# Patient Record
Sex: FEMALE | Race: WHITE | NOT HISPANIC OR LATINO | Employment: PART TIME | URBAN - METROPOLITAN AREA
[De-identification: names, ages, dates, MRNs, and addresses within clinical notes are randomized per-mention and may not be internally consistent; named-entity substitution may affect disease eponyms.]

---

## 2017-07-15 ENCOUNTER — APPOINTMENT (OUTPATIENT)
Dept: GENERAL RADIOLOGY | Facility: HOSPITAL | Age: 72
End: 2017-07-15

## 2017-07-15 ENCOUNTER — APPOINTMENT (OUTPATIENT)
Dept: MRI IMAGING | Facility: HOSPITAL | Age: 72
End: 2017-07-15

## 2017-07-15 ENCOUNTER — APPOINTMENT (OUTPATIENT)
Dept: CT IMAGING | Facility: HOSPITAL | Age: 72
End: 2017-07-15

## 2017-07-15 ENCOUNTER — HOSPITAL ENCOUNTER (INPATIENT)
Facility: HOSPITAL | Age: 72
LOS: 3 days | Discharge: HOME | End: 2017-07-18
Attending: EMERGENCY MEDICINE | Admitting: INTERNAL MEDICINE

## 2017-07-15 DIAGNOSIS — G45.8 OTHER SPECIFIED TRANSIENT CEREBRAL ISCHEMIAS: Primary | ICD-10-CM

## 2017-07-15 DIAGNOSIS — Z86.79 HISTORY OF CORONARY ARTERY DISEASE: ICD-10-CM

## 2017-07-15 DIAGNOSIS — Z74.09 IMPAIRED FUNCTIONAL MOBILITY, BALANCE, GAIT, AND ENDURANCE: ICD-10-CM

## 2017-07-15 DIAGNOSIS — I16.9 HYPERTENSIVE CRISIS: ICD-10-CM

## 2017-07-15 DIAGNOSIS — R07.9 CHEST PAIN, UNSPECIFIED TYPE: ICD-10-CM

## 2017-07-15 PROBLEM — G45.9 TRANSIENT CEREBRAL ISCHEMIA: Status: ACTIVE | Noted: 2017-07-15

## 2017-07-15 PROBLEM — I10 HTN (HYPERTENSION): Status: ACTIVE | Noted: 2017-07-15

## 2017-07-15 PROBLEM — I25.10 CAD (CORONARY ARTERY DISEASE): Status: ACTIVE | Noted: 2017-07-15

## 2017-07-15 LAB
ALBUMIN SERPL-MCNC: 3.9 G/DL (ref 3.2–4.8)
ALBUMIN/GLOB SERPL: 0.8 G/DL (ref 1.5–2.5)
ALP SERPL-CCNC: 115 U/L (ref 25–100)
ALT SERPL W P-5'-P-CCNC: 16 U/L (ref 7–40)
ANION GAP SERPL CALCULATED.3IONS-SCNC: 9 MMOL/L (ref 3–11)
AST SERPL-CCNC: 29 U/L (ref 0–33)
BACTERIA UR QL AUTO: NORMAL /HPF
BASOPHILS # BLD AUTO: 0.01 10*3/MM3 (ref 0–0.2)
BASOPHILS NFR BLD AUTO: 0.2 % (ref 0–1)
BILIRUB SERPL-MCNC: 0.2 MG/DL (ref 0.3–1.2)
BILIRUB UR QL STRIP: NEGATIVE
BNP SERPL-MCNC: 125 PG/ML (ref 0–100)
BUN BLD-MCNC: 24 MG/DL (ref 9–23)
BUN/CREAT SERPL: 21.8 (ref 7–25)
CALCIUM SPEC-SCNC: 10 MG/DL (ref 8.7–10.4)
CHLORIDE SERPL-SCNC: 101 MMOL/L (ref 99–109)
CLARITY UR: CLEAR
CO2 SERPL-SCNC: 23 MMOL/L (ref 20–31)
COLOR UR: YELLOW
CREAT BLD-MCNC: 1.1 MG/DL (ref 0.6–1.3)
DEPRECATED RDW RBC AUTO: 40.8 FL (ref 37–54)
EOSINOPHIL # BLD AUTO: 0.11 10*3/MM3 (ref 0–0.3)
EOSINOPHIL NFR BLD AUTO: 2 % (ref 0–3)
ERYTHROCYTE [DISTWIDTH] IN BLOOD BY AUTOMATED COUNT: 12.4 % (ref 11.3–14.5)
GFR SERPL CREATININE-BSD FRML MDRD: 49 ML/MIN/1.73
GLOBULIN UR ELPH-MCNC: 4.7 GM/DL
GLUCOSE BLD-MCNC: 105 MG/DL (ref 70–100)
GLUCOSE UR STRIP-MCNC: NEGATIVE MG/DL
HCT VFR BLD AUTO: 33.2 % (ref 34.5–44)
HGB BLD-MCNC: 11.1 G/DL (ref 11.5–15.5)
HGB UR QL STRIP.AUTO: NEGATIVE
HOLD SPECIMEN: NORMAL
HOLD SPECIMEN: NORMAL
HYALINE CASTS UR QL AUTO: NORMAL /LPF
IMM GRANULOCYTES # BLD: 0 10*3/MM3 (ref 0–0.03)
IMM GRANULOCYTES NFR BLD: 0 % (ref 0–0.6)
KETONES UR QL STRIP: NEGATIVE
LEUKOCYTE ESTERASE UR QL STRIP.AUTO: NEGATIVE
LIPASE SERPL-CCNC: 66 U/L (ref 6–51)
LYMPHOCYTES # BLD AUTO: 0.87 10*3/MM3 (ref 0.6–4.8)
LYMPHOCYTES NFR BLD AUTO: 15.9 % (ref 24–44)
MCH RBC QN AUTO: 30.2 PG (ref 27–31)
MCHC RBC AUTO-ENTMCNC: 33.4 G/DL (ref 32–36)
MCV RBC AUTO: 90.2 FL (ref 80–99)
MONOCYTES # BLD AUTO: 0.42 10*3/MM3 (ref 0–1)
MONOCYTES NFR BLD AUTO: 7.7 % (ref 0–12)
NEUTROPHILS # BLD AUTO: 4.06 10*3/MM3 (ref 1.5–8.3)
NEUTROPHILS NFR BLD AUTO: 74.2 % (ref 41–71)
NITRITE UR QL STRIP: NEGATIVE
PH UR STRIP.AUTO: 6 [PH] (ref 5–8)
PLATELET # BLD AUTO: 242 10*3/MM3 (ref 150–450)
PMV BLD AUTO: 10.2 FL (ref 6–12)
POTASSIUM BLD-SCNC: 4.3 MMOL/L (ref 3.5–5.5)
PROT SERPL-MCNC: 8.6 G/DL (ref 5.7–8.2)
PROT UR QL STRIP: ABNORMAL
RBC # BLD AUTO: 3.68 10*6/MM3 (ref 3.89–5.14)
RBC # UR: NORMAL /HPF
REF LAB TEST METHOD: NORMAL
SODIUM BLD-SCNC: 133 MMOL/L (ref 132–146)
SP GR UR STRIP: <=1.005 (ref 1–1.03)
SQUAMOUS #/AREA URNS HPF: NORMAL /HPF
TROPONIN I SERPL-MCNC: 0 NG/ML (ref 0–0.07)
TROPONIN I SERPL-MCNC: 0 NG/ML (ref 0–0.07)
TROPONIN I SERPL-MCNC: 0.03 NG/ML
TROPONIN I SERPL-MCNC: 0.55 NG/ML
TSH SERPL DL<=0.05 MIU/L-ACNC: 1.47 MIU/ML (ref 0.35–5.35)
UROBILINOGEN UR QL STRIP: ABNORMAL
WBC NRBC COR # BLD: 5.47 10*3/MM3 (ref 3.5–10.8)
WBC UR QL AUTO: NORMAL /HPF
WHOLE BLOOD HOLD SPECIMEN: NORMAL
WHOLE BLOOD HOLD SPECIMEN: NORMAL

## 2017-07-15 PROCEDURE — 71010 HC CHEST PA OR AP: CPT

## 2017-07-15 PROCEDURE — 25010000002 HYDRALAZINE PER 20 MG: Performed by: EMERGENCY MEDICINE

## 2017-07-15 PROCEDURE — 85025 COMPLETE CBC W/AUTO DIFF WBC: CPT | Performed by: EMERGENCY MEDICINE

## 2017-07-15 PROCEDURE — 83690 ASSAY OF LIPASE: CPT | Performed by: EMERGENCY MEDICINE

## 2017-07-15 PROCEDURE — 99222 1ST HOSP IP/OBS MODERATE 55: CPT | Performed by: PSYCHIATRY & NEUROLOGY

## 2017-07-15 PROCEDURE — 70498 CT ANGIOGRAPHY NECK: CPT

## 2017-07-15 PROCEDURE — 70551 MRI BRAIN STEM W/O DYE: CPT

## 2017-07-15 PROCEDURE — 99223 1ST HOSP IP/OBS HIGH 75: CPT | Performed by: HOSPITALIST

## 2017-07-15 PROCEDURE — 0 IOPAMIDOL 61 % SOLUTION: Performed by: HOSPITALIST

## 2017-07-15 PROCEDURE — 84484 ASSAY OF TROPONIN QUANT: CPT

## 2017-07-15 PROCEDURE — 83880 ASSAY OF NATRIURETIC PEPTIDE: CPT | Performed by: EMERGENCY MEDICINE

## 2017-07-15 PROCEDURE — 70450 CT HEAD/BRAIN W/O DYE: CPT

## 2017-07-15 PROCEDURE — 80053 COMPREHEN METABOLIC PANEL: CPT | Performed by: EMERGENCY MEDICINE

## 2017-07-15 PROCEDURE — 81001 URINALYSIS AUTO W/SCOPE: CPT | Performed by: EMERGENCY MEDICINE

## 2017-07-15 PROCEDURE — 25010000002 HEPARIN (PORCINE) PER 1000 UNITS: Performed by: HOSPITALIST

## 2017-07-15 PROCEDURE — 93005 ELECTROCARDIOGRAM TRACING: CPT

## 2017-07-15 PROCEDURE — 84484 ASSAY OF TROPONIN QUANT: CPT | Performed by: HOSPITALIST

## 2017-07-15 PROCEDURE — 84443 ASSAY THYROID STIM HORMONE: CPT | Performed by: HOSPITALIST

## 2017-07-15 PROCEDURE — 93005 ELECTROCARDIOGRAM TRACING: CPT | Performed by: EMERGENCY MEDICINE

## 2017-07-15 PROCEDURE — 99285 EMERGENCY DEPT VISIT HI MDM: CPT

## 2017-07-15 PROCEDURE — 93005 ELECTROCARDIOGRAM TRACING: CPT | Performed by: NURSE PRACTITIONER

## 2017-07-15 PROCEDURE — 25010000002 ONDANSETRON PER 1 MG: Performed by: EMERGENCY MEDICINE

## 2017-07-15 PROCEDURE — 0042T HC CT CEREBRAL PERFUSION W/WO CONTRAST: CPT

## 2017-07-15 PROCEDURE — 70496 CT ANGIOGRAPHY HEAD: CPT

## 2017-07-15 RX ORDER — ALBUTEROL SULFATE 90 UG/1
2 AEROSOL, METERED RESPIRATORY (INHALATION) 4 TIMES DAILY PRN
COMMUNITY

## 2017-07-15 RX ORDER — VALSARTAN AND HYDROCHLOROTHIAZIDE 160; 12.5 MG/1; MG/1
0.5 TABLET, FILM COATED ORAL DAILY
COMMUNITY
End: 2017-07-15

## 2017-07-15 RX ORDER — SODIUM CHLORIDE 0.9 % (FLUSH) 0.9 %
10 SYRINGE (ML) INJECTION AS NEEDED
Status: DISCONTINUED | OUTPATIENT
Start: 2017-07-15 | End: 2017-07-18 | Stop reason: HOSPADM

## 2017-07-15 RX ORDER — ASPIRIN 300 MG/1
300 SUPPOSITORY RECTAL ONCE
Status: DISCONTINUED | OUTPATIENT
Start: 2017-07-15 | End: 2017-07-15

## 2017-07-15 RX ORDER — NITROGLYCERIN 0.4 MG/1
0.4 TABLET SUBLINGUAL
COMMUNITY
End: 2017-07-18 | Stop reason: HOSPADM

## 2017-07-15 RX ORDER — OMEPRAZOLE 20 MG/1
40 CAPSULE, DELAYED RELEASE ORAL DAILY
Status: ON HOLD | COMMUNITY
End: 2017-07-17

## 2017-07-15 RX ORDER — ACETAMINOPHEN 160 MG
2000 TABLET,DISINTEGRATING ORAL NIGHTLY
COMMUNITY

## 2017-07-15 RX ORDER — CLOPIDOGREL BISULFATE 75 MG/1
75 TABLET ORAL DAILY
COMMUNITY
End: 2017-07-18 | Stop reason: HOSPADM

## 2017-07-15 RX ORDER — VALSARTAN AND HYDROCHLOROTHIAZIDE 160; 12.5 MG/1; MG/1
1 TABLET, FILM COATED ORAL DAILY
COMMUNITY
End: 2017-07-18 | Stop reason: HOSPADM

## 2017-07-15 RX ORDER — MECLIZINE HCL 25MG 25 MG/1
25 TABLET, CHEWABLE ORAL 3 TIMES DAILY PRN
COMMUNITY
End: 2018-07-19

## 2017-07-15 RX ORDER — HYDRALAZINE HYDROCHLORIDE 20 MG/ML
20 INJECTION INTRAMUSCULAR; INTRAVENOUS ONCE
Status: COMPLETED | OUTPATIENT
Start: 2017-07-15 | End: 2017-07-15

## 2017-07-15 RX ORDER — ONDANSETRON 2 MG/ML
4 INJECTION INTRAMUSCULAR; INTRAVENOUS ONCE
Status: COMPLETED | OUTPATIENT
Start: 2017-07-15 | End: 2017-07-15

## 2017-07-15 RX ORDER — HEPARIN SODIUM 5000 [USP'U]/ML
5000 INJECTION, SOLUTION INTRAVENOUS; SUBCUTANEOUS EVERY 8 HOURS SCHEDULED
Status: DISCONTINUED | OUTPATIENT
Start: 2017-07-15 | End: 2017-07-16

## 2017-07-15 RX ORDER — ASPIRIN 81 MG/1
324 TABLET, CHEWABLE ORAL ONCE
Status: COMPLETED | OUTPATIENT
Start: 2017-07-15 | End: 2017-07-15

## 2017-07-15 RX ORDER — ACETAMINOPHEN 325 MG/1
650 TABLET ORAL EVERY 6 HOURS PRN
Status: DISCONTINUED | OUTPATIENT
Start: 2017-07-15 | End: 2017-07-18 | Stop reason: HOSPADM

## 2017-07-15 RX ORDER — MECLIZINE HYDROCHLORIDE 25 MG/1
12.5 TABLET ORAL 3 TIMES DAILY PRN
Status: DISCONTINUED | OUTPATIENT
Start: 2017-07-15 | End: 2017-07-18 | Stop reason: HOSPADM

## 2017-07-15 RX ORDER — ASPIRIN 81 MG/1
81 TABLET ORAL EVERY 12 HOURS
COMMUNITY
End: 2018-03-27

## 2017-07-15 RX ORDER — SODIUM CHLORIDE 9 MG/ML
100 INJECTION, SOLUTION INTRAVENOUS CONTINUOUS
Status: DISCONTINUED | OUTPATIENT
Start: 2017-07-15 | End: 2017-07-18 | Stop reason: HOSPADM

## 2017-07-15 RX ORDER — CLOPIDOGREL BISULFATE 75 MG/1
75 TABLET ORAL DAILY
Status: DISCONTINUED | OUTPATIENT
Start: 2017-07-15 | End: 2017-07-16

## 2017-07-15 RX ORDER — ALBUTEROL SULFATE 2.5 MG/3ML
2.5 SOLUTION RESPIRATORY (INHALATION) EVERY 4 HOURS PRN
COMMUNITY
End: 2017-07-15

## 2017-07-15 RX ORDER — PANTOPRAZOLE SODIUM 40 MG/1
40 TABLET, DELAYED RELEASE ORAL EVERY MORNING
Status: DISCONTINUED | OUTPATIENT
Start: 2017-07-16 | End: 2017-07-18 | Stop reason: HOSPADM

## 2017-07-15 RX ORDER — ALBUTEROL SULFATE 2.5 MG/3ML
2.5 SOLUTION RESPIRATORY (INHALATION) EVERY 4 HOURS PRN
Status: CANCELLED | OUTPATIENT
Start: 2017-07-15

## 2017-07-15 RX ORDER — ATORVASTATIN CALCIUM 40 MG/1
80 TABLET, FILM COATED ORAL NIGHTLY
Status: DISCONTINUED | OUTPATIENT
Start: 2017-07-15 | End: 2017-07-18 | Stop reason: HOSPADM

## 2017-07-15 RX ORDER — SODIUM CHLORIDE 0.9 % (FLUSH) 0.9 %
1-10 SYRINGE (ML) INJECTION AS NEEDED
Status: DISCONTINUED | OUTPATIENT
Start: 2017-07-15 | End: 2017-07-18 | Stop reason: HOSPADM

## 2017-07-15 RX ORDER — CARVEDILOL 12.5 MG/1
12.5 TABLET ORAL 2 TIMES DAILY WITH MEALS
COMMUNITY
End: 2018-07-20 | Stop reason: HOSPADM

## 2017-07-15 RX ADMIN — SODIUM CHLORIDE 1000 ML: 9 INJECTION, SOLUTION INTRAVENOUS at 14:06

## 2017-07-15 RX ADMIN — ONDANSETRON 4 MG: 2 INJECTION INTRAMUSCULAR; INTRAVENOUS at 12:58

## 2017-07-15 RX ADMIN — HEPARIN SODIUM 5000 UNITS: 5000 INJECTION, SOLUTION INTRAVENOUS; SUBCUTANEOUS at 22:03

## 2017-07-15 RX ADMIN — HYDRALAZINE HYDROCHLORIDE 20 MG: 20 INJECTION INTRAMUSCULAR; INTRAVENOUS at 11:36

## 2017-07-15 RX ADMIN — SODIUM CHLORIDE 1000 ML: 9 INJECTION, SOLUTION INTRAVENOUS at 12:22

## 2017-07-15 RX ADMIN — ATORVASTATIN CALCIUM 80 MG: 40 TABLET, FILM COATED ORAL at 22:03

## 2017-07-15 RX ADMIN — ASPIRIN 81 MG CHEWABLE TABLET 324 MG: 81 TABLET CHEWABLE at 13:05

## 2017-07-15 RX ADMIN — IOPAMIDOL 115 ML: 612 INJECTION, SOLUTION INTRAVENOUS at 13:48

## 2017-07-15 RX ADMIN — ACETAMINOPHEN 650 MG: 325 TABLET, FILM COATED ORAL at 22:39

## 2017-07-15 NOTE — CONSULTS
Neurology Note    Patient:  Janice Romero    YOB: 1945    REFERRING PHYSICIAN:  No ref. provider found    CHIEF COMPLAINT:    AMS, speech difficulty    HISTORY OF PRESENT ILLNESS:   The patient is a 71 y.o. female admitted with sudden onset of right arm numbness and weakness, chest pain, jumbled speech, headache, /102 on arrival to the ED. Symptoms gradually improving. Received hydralazine 25 mg which dropped her SBP to 106, developed more difficulty with speech, improved with IV fluids. She is chronically hypertensive with SBP at home in 160-170 range. Denies prior history of stroke. On aspirin and Plavix.    Past Medical History:  Past Medical History:   Diagnosis Date   • CKD (chronic kidney disease)    • Hypertension    • MI (myocardial infarction)    • Stroke        Past Surgical History:  Past Surgical History:   Procedure Laterality Date   • CHOLECYSTECTOMY     • CORONARY STENT PLACEMENT     • HYSTERECTOMY     • TUBAL ABDOMINAL LIGATION         Social History:   Social History     Social History   • Marital status:      Spouse name: N/A   • Number of children: N/A   • Years of education: N/A     Social History Main Topics   • Smoking status: Never Smoker   • Smokeless tobacco: None   • Alcohol use No   • Drug use: No   • Sexual activity: Defer     Other Topics Concern   • None     Social History Narrative   • None        Family History:   History reviewed. No pertinent family history.    Medications Prior to Admission:    Prior to Admission medications    Medication Sig Start Date End Date Taking? Authorizing Provider   albuterol (PROVENTIL HFA;VENTOLIN HFA) 108 (90 BASE) MCG/ACT inhaler Inhale 2 puffs 4 (Four) Times a Day As Needed for Wheezing.   Yes Historical Provider, MD   carvedilol (COREG) 3.125 MG tablet Take 3.125 mg by mouth 2 (Two) Times a Day With Meals.   Yes Historical Provider, MD   cholecalciferol (VITAMIN D3) 1000 UNITS tablet Take 1,000 Units by mouth Daily.    Yes Historical Provider, MD   clopidogrel (PLAVIX) 75 MG tablet Take 75 mg by mouth Daily.   Yes Historical Provider, MD   Garlic 1000 MG capsule Take 1,000 mg by mouth Daily.   Yes Historical Provider, MD   meclizine 25 MG chewable tablet chewable tablet Chew 25 mg 3 (Three) Times a Day As Needed.   Yes Historical Provider, MD   Multiple Vitamins-Minerals (MULTIVITAMIN ADULT PO) Take 1 tablet by mouth Daily.   Yes Historical Provider, MD   nitroglycerin (NITROSTAT) 0.4 MG SL tablet Place 0.4 mg under the tongue Every 5 (Five) Minutes As Needed for Chest Pain. Take no more than 3 doses in 15 minutes.   Yes Historical Provider, MD   omeprazole (priLOSEC) 20 MG capsule Take 20 mg by mouth Daily.   Yes Historical Provider, MD   valsartan-hydrochlorothiazide (DIOVAN-HCT) 160-12.5 MG per tablet Take 1 tablet by mouth Daily.   Yes Historical Provider, MD   albuterol (PROVENTIL) (2.5 MG/3ML) 0.083% nebulizer solution Take 2.5 mg by nebulization Every 4 (Four) Hours As Needed for Wheezing.  7/15/17  Historical Provider, MD   valsartan-hydrochlorothiazide (DIOVAN-HCT) 160-12.5 MG per tablet Take 0.5 tablets by mouth Daily.  7/15/17  Historical Provider, MD       Allergies:  Simvastatin; Lexapro [escitalopram]; Acyclovir and related; Altace [ramipril]; Aspirin; Benicar [olmesartan]; Bystolic [nebivolol hcl]; Cephalexin; Codeine; Dynacirc [isradipine]; Factive [gemifloxacin]; Levaquin [levofloxacin in d5w]; Lisinopril; Norvasc [amlodipine]; Penicillins; Rocephin [ceftriaxone]; Septra [sulfamethoxazole-trimethoprim]; Talwin [pentazocine]; Tekturna [aliskiren]; Trimox [amoxicillin]; Verelan [verapamil hcl er]; Zithromax [azithromycin]; and Zomig [zolmitriptan]      Review of system  Review of Systems   Cardiovascular: Positive for chest pain.   Gastrointestinal: Positive for diarrhea and nausea.   Neurological: Positive for speech difficulty and headaches.   Psychiatric/Behavioral: Positive for confusion.   All other systems  reviewed and are negative.      Vitals:    07/15/17 1440   BP:    Pulse:    Resp: 18   Temp: 98.1 °F (36.7 °C)   SpO2:        Physical exam  Physical Exam   Constitutional: She is oriented to person, place, and time. She appears well-developed and well-nourished.   HENT:   Head: Normocephalic.   Eyes: EOM are normal. Pupils are equal, round, and reactive to light.   Neck: Normal range of motion. Neck supple.   Cardiovascular: Normal rate and regular rhythm.    Pulmonary/Chest: Effort normal and breath sounds normal.   Abdominal: Soft. Bowel sounds are normal.   Neurological: She is alert and oriented to person, place, and time. She has normal reflexes. She displays normal reflexes. No cranial nerve deficit or sensory deficit. She exhibits normal muscle tone. Coordination normal. She displays no Babinski's sign on the right side. She displays no Babinski's sign on the left side.   Skin: Skin is warm and dry.   Psychiatric: She has a normal mood and affect. Her behavior is normal. Thought content normal.         Lab Results   Component Value Date    WBC 5.47 07/15/2017    HGB 11.1 (L) 07/15/2017    HCT 33.2 (L) 07/15/2017    MCV 90.2 07/15/2017     07/15/2017     Lab Results   Component Value Date    GLUCOSE 105 (H) 07/15/2017    BUN 24 (H) 07/15/2017    CREATININE 1.10 07/15/2017    EGFRIFNONA 49 (L) 07/15/2017    BCR 21.8 07/15/2017    CO2 23.0 07/15/2017    CALCIUM 10.0 07/15/2017    ALBUMIN 3.90 07/15/2017    LABIL2 0.8 (L) 07/15/2017    AST 29 07/15/2017    ALT 16 07/15/2017         Radiological Studies:  EXAMINATION: CT HEAD WO CONTRAST - 07/15/2017      INDICATION: Right upper extremity weakness.      TECHNIQUE: CT scan of the head was performed at 5 mm intervals. No  intravenous contrast was utilized.       The radiation dose reduction device was turned on for each scan per the  ALARA (As Low as Reasonably Achievable) protocol.      COMPARISON: MR of the brain dated 10/17/2015.      FINDINGS: There is  no intra-axial mass. There is no hemorrhage. There is  no midline shift or extra-axial fluid collection.There is a tiny lacunar  infarct in the right thalamic region.      IMPRESSION:  There are no acute findings.  EXAMINATION: CT CEREBRAL PERFUSION W/O CONTRAST - 07/15/2017      INDICATION: G45.8-Other transient cerebral ischemic attacks and related  syndromes; I16.9-Hypertensive crisis, unspecified; R07.9-Chest pain,  unspecified; Z86.79-Personal history of other diseases of the  circulatory system.      TECHNIQUE: Cerebral profusion imaging was obtained of the head without  the administration of intravenous contrast according to the CT perfusion  protocol. Parametric maps were reconstructed to demonstrate cerebral  blood flow, cerebral blood volume, time to drain and mean transit time.      The radiation dose reduction device was turned on for each scan per the  ALARA (As Low as Reasonably Achievable) protocol.      COMPARISON: None.      FINDINGS: There is no significant flow asymmetry on perfusion, mean  transit time and time to maximum flow, and there is no irregularity when  compared with blood volume.       IMPRESSION:  Negative perfusion imaging of reversible neural ischemia.     EXAMINATION: CT ANGIOGRAM HEAD W WO CONTRAST - 07/15/2017      INDICATION: G45.8-Other transient cerebral ischemic attacks and related  syndromes; I16.9-Hypertensive crisis, unspecified; R07.9-Chest pain,  unspecified; Z86.79-Personal history of other diseases of the  circulatory system.      TECHNIQUE: Intracranial CT angiogram was performed with images acquired  in the axial plane and displayed in the axial as well as the  reconstructed sagittal and coronal projections (3D).      The radiation dose reduction device was turned on for each scan per the  ALARA (As Low as Reasonably Achievable) protocol.      COMPARISON: None.      FINDINGS: The intracranial portion of the internal carotid arteries is  normal. Anterior cerebral  arteries and anterior communicating artery are  normal. Both middle cerebral arteries are normal with no stenosis. The  posterior fossa circulation is also normal.      IMPRESSION:  Normal intracranial CTA.    EXAMINATION: CT ANGIOGRAM NECK W WO CONTRAST - 07/15/2017      INDICATION: Aphasia; G45.8-Other transient cerebral ischemic attacks and  related syndromes; I16.9-Hypertensive crisis, unspecified; R07.9-Chest  pain, unspecified; Z86.79-Personal history of other diseases of the  circulatory system.      TECHNIQUE: CT angiogram of the neck was performed prior to and following  intravenous contrast. Images are displayed in the axial, coronal and  three-dimensional projections.      The radiation dose reduction device was turned on for each scan per the  ALARA (As Low as Reasonably Achievable) protocol.      COMPARISON: None.      FINDINGS: Both common carotid arteries are normal. There is no  calcification of the carotid arteries and the bifurcations are widely  patent with no stenosis. Both vertebral arteries are patent as is the  basilar artery.      IMPRESSION:  Normal CT angiogram of the neck. There is no carotid  occlusive disease.    During this visit the following were done:  Labs Reviewed [x]    Labs Ordered []    Radiology Reports Reviewed [x]    Radiology Ordered []    EKG, echo, and/or stress test reviewed []    EEG results reviewed  []    EEG reviewed and interpreted per myself   []    Discussed case with neurointerventionalist or neuroradiologist []    Referring Provider Records Reviewed [x]    ER Records Reviewed [x]    Hospital Records Reviewed []    History Obtained From Family []    Radiological images view and Interpreted per myself [x]    Case Discussed with referring provider [x]     Decision to obtain and request outside records  []        Assessment and Plan     1. Other specified transient cerebral ischemias    2. Hypertensive crisis    3. Chest pain, unspecified type    4. History of  coronary artery disease      Suspect hypertensive encephalopathy related symptoms followed by relative hypotension, presently better with resolved neurological symptoms, CTP encouraging. MRI of brain and PY12 requested. Continue aspirin, Plavix, admit to telemetry, BP control.        Electronically signed by Rene Quintanilla MD on 7/15/2017 at 2:42 PM

## 2017-07-15 NOTE — PLAN OF CARE
Problem: Patient Care Overview (Adult)  Goal: Plan of Care Review  Outcome: Ongoing (interventions implemented as appropriate)    07/15/17 5273   Coping/Psychosocial Response Interventions   Plan Of Care Reviewed With patient   Patient Care Overview   Progress no change   Outcome Evaluation   Outcome Summary/Follow up Plan 72 yo female admitted 7/15/17 w/ TIA and hypertensive crisis. Pt reported to have had chest pain at work w/ garbled speech. Workup at [facility] negative for CVA, pt w/ nausea and BM x 2 since arriving to ED. NIH 0, VSS, Tele NSR, passed dysphagia screen and cleared for cardiac diet per Dr. Voss. Unknown dispostion at this time, consults pending for PT, OT, Speech, Case mgmt, and Diabetes Educator.

## 2017-07-15 NOTE — H&P
[facility] Medicine Services  HISTORY AND PHYSICAL    Primary Care Physician: Fred Winter MD    Subjective     Chief Complaint:  Weakness    History of Present Illness:     71 year old presented to work and noted to have R weakness/numbness, with associated HA and chest pain. She is unable to give me an detailed history as she is experiencing expressive aphasia now. She states that her R side feels heavy. Denies chest pain, but notes nausea. Of noted her SBP was greater than 200 in the ED, and following hydralazine, dropped as low as 106.  She is currently getting IVF boluses.    Review of Systems   Unable to obtain except for above    Past Medical History:   Diagnosis Date   • CKD (chronic kidney disease)    • Hypertension    • MI (myocardial infarction)    • Stroke        Past Surgical History:   Procedure Laterality Date   • CHOLECYSTECTOMY     • CORONARY STENT PLACEMENT     • HYSTERECTOMY     • TUBAL ABDOMINAL LIGATION         History reviewed. No pertinent family history. Reviewed.    Social History     Social History   • Marital status:      Spouse name: N/A   • Number of children: N/A   • Years of education: N/A     Occupational History   • Not on file.     Social History Main Topics   • Smoking status: Never Smoker   • Smokeless tobacco: Not on file   • Alcohol use No   • Drug use: No   • Sexual activity: Defer     Other Topics Concern   • Not on file     Social History Narrative   • No narrative on file       Medications:  Reviewed/reconciled    (Not in a hospital admission)    Allergies:  Allergies   Allergen Reactions   • Simvastatin Shortness Of Breath   • Lexapro [Escitalopram] Other (See Comments)     tremors   • Acyclovir And Related Rash   • Altace [Ramipril] Rash   • Aspirin Rash   • Benicar [Olmesartan] Rash   • Bystolic [Nebivolol Hcl] Rash   • Cephalexin Rash   • Codeine Rash   • Dynacirc [Isradipine] Rash   • Factive [Gemifloxacin] Rash   • Levaquin  "[Levofloxacin In D5w] Rash   • Lisinopril Rash   • Norvasc [Amlodipine] Rash   • Penicillins Rash   • Rocephin [Ceftriaxone] Rash   • Septra [Sulfamethoxazole-Trimethoprim] Rash   • Talwin [Pentazocine] Rash   • Tekturna [Aliskiren] Rash   • Trimox [Amoxicillin]    • Verelan [Verapamil Hcl Er] Rash   • Zithromax [Azithromycin] Rash   • Zomig [Zolmitriptan] Rash         Objective     Physical Exam:  Vital Signs: /60  Pulse 61  Temp 98.2 °F (36.8 °C) (Oral)   Resp 16  Ht 61\" (154.9 cm)  Wt 120 lb (54.4 kg)  SpO2 100%  BMI 22.67 kg/m2  Physical Exam   Constitutional: She appears well-developed and well-nourished.   HENT:   Head: Normocephalic and atraumatic.   Nose: Nose normal.   Mouth/Throat: Oropharynx is clear and moist.   Eyes: Conjunctivae and EOM are normal. Pupils are equal, round, and reactive to light.   Neck: Normal range of motion. Neck supple. Thyromegaly present.   Cardiovascular: Normal rate, regular rhythm and normal heart sounds.    Pulmonary/Chest: Effort normal and breath sounds normal. No respiratory distress.   Abdominal: Soft. Bowel sounds are normal. She exhibits no distension. There is no tenderness. There is no guarding.   Musculoskeletal: Normal range of motion.   Lymphadenopathy:     She has no cervical adenopathy.   Neurological: She is alert.   But with expressive aphasia.  R weak on my exam, but improved to ED physician. PERRL. No facial droop. Appears anxious. NO diaphoresis.   Skin: Skin is warm.   Psychiatric:   Anxious   Vitals reviewed.          Results Reviewed:    Results from last 7 days  Lab Units 07/15/17  1040   WBC 10*3/mm3 5.47   HEMOGLOBIN g/dL 11.1*   PLATELETS 10*3/mm3 242       Results from last 7 days  Lab Units 07/15/17  1040   SODIUM mmol/L 133   POTASSIUM mmol/L 4.3   CO2 mmol/L 23.0   CREATININE mg/dL 1.10   GLUCOSE mg/dL 105*   CALCIUM mg/dL 10.0       I have personally reviewed and interpreted available lab data, radiology studies and ECG obtained at " time of admission.     Assessment / Plan     Problem List:   Hospital Problem List     Transient cerebral ischemia    CAD (coronary artery disease)    HTN (hypertension)          TIA  --with recurrent sympotms in ED during my exam, ED d/w neurology, STAT CT perfusion/CTA pending  --asa/plavix/statin  --risk stratification per stroke protocol, NSR EKG  --ED reports that she may have a history of carotid stenosis, unconfirmed  Chest pain, with history of CAD  --with reported unremarkable catheterization per son at bedside  --get old records  --previously seen by Deondre, has history of stents  --EKG unchanged to me from prior here in 2015  --trend troponins, may be related to HTN disease  HTN urgency  --permissive HTN now  Mild anemia  HX of CKD      DVT prophylaxis:  SC Heparin  Code Status:  Full  Admission Status: Patient will be admitted to inpatient care.     Asad Voss MD 07/15/17 1:21 PM

## 2017-07-15 NOTE — ED PROVIDER NOTES
"Subjective   HPI Comments: Janice Romero is a 71 y.o. female with a hx of MI, CKD, and CVA who presents to the ED w/ c/o CP. At 0800 this morning, she was working at [employer] when she began having numbness and weakness in her right arm and hand, followed by left sided CP. When she tried to talk to her coworkers, she could only mumble and states that \"everything was jumbled.\" She took some NTG with some improvement, and reports having taken all her prescribed medication today. Currently all her symptoms have resolved including her CP, speech changes, and numbness in her right arm. She felt fine yesterday and this morning, and had a normal BP for her, 160/80, this morning. Her last heart cath was less than a year ago and required no intervention. She reports a history of carotid artery disease followed by her cardiologist in [place]. She reports nothing else acute at this time.     Patient is a 71 y.o. female presenting with chest pain.   History provided by:  Patient  Chest Pain   Pain location:  L chest  Pain radiates to:  Does not radiate  Onset quality:  Sudden  Timing:  Constant  Progression:  Resolved  Chronicity:  New  Relieved by:  Nitroglycerin  Associated symptoms: numbness (Right arm and hand) and weakness (Right arm and hand)        Review of Systems   Cardiovascular: Positive for chest pain.   Neurological: Positive for speech difficulty, weakness (Right arm and hand) and numbness (Right arm and hand).   All other systems reviewed and are negative.      Past Medical History:   Diagnosis Date   • CKD (chronic kidney disease)    • Hypertension    • MI (myocardial infarction)    • Stroke        Allergies   Allergen Reactions   • Simvastatin Shortness Of Breath   • Lexapro [Escitalopram] Other (See Comments)     tremors   • Acyclovir And Related Rash   • Altace [Ramipril] Rash   • Aspirin Rash   • Benicar [Olmesartan] Rash   • Bystolic [Nebivolol Hcl] Rash   • Cephalexin Rash   • Codeine Rash   • Dynacirc " [Isradipine] Rash   • Factive [Gemifloxacin] Rash   • Levaquin [Levofloxacin In D5w] Rash   • Lisinopril Rash   • Norvasc [Amlodipine] Rash   • Penicillins Rash   • Rocephin [Ceftriaxone] Rash   • Septra [Sulfamethoxazole-Trimethoprim] Rash   • Talwin [Pentazocine] Rash   • Tekturna [Aliskiren] Rash   • Trimox [Amoxicillin]    • Verelan [Verapamil Hcl Er] Rash   • Zithromax [Azithromycin] Rash   • Zomig [Zolmitriptan] Rash       Past Surgical History:   Procedure Laterality Date   • CHOLECYSTECTOMY     • CORONARY STENT PLACEMENT     • HYSTERECTOMY     • TUBAL ABDOMINAL LIGATION         History reviewed. No pertinent family history.    Social History     Social History   • Marital status:      Spouse name: N/A   • Number of children: N/A   • Years of education: N/A     Social History Main Topics   • Smoking status: Never Smoker   • Smokeless tobacco: None   • Alcohol use No   • Drug use: No   • Sexual activity: Defer     Other Topics Concern   • None     Social History Narrative   • None         Objective   Physical Exam   Constitutional: She is oriented to person, place, and time. She appears well-developed and well-nourished. No distress.   HENT:   Head: Normocephalic and atraumatic.   Nose: Nose normal.   Eyes: Conjunctivae are normal. No scleral icterus.   Neck: Normal range of motion. Neck supple.   Cardiovascular: Normal rate, regular rhythm, normal heart sounds and intact distal pulses.    No murmur heard.  Pulmonary/Chest: Effort normal and breath sounds normal. No respiratory distress.   Abdominal: Soft. Bowel sounds are normal. There is no tenderness.   Musculoskeletal: Normal range of motion.   Neurological: She is alert and oriented to person, place, and time.   No neurological deficits.    Skin: Skin is warm and dry. She is not diaphoretic.   Psychiatric: She has a normal mood and affect. Her behavior is normal.   Nursing note and vitals reviewed.      Critical Care  Performed by: CIARA ZULETA  B  Authorized by: DINORAH HAYWOOD   Total critical care time: 35 minutes  Critical care time was exclusive of separately billable procedures and treating other patients.  Critical care was necessary to treat or prevent imminent or life-threatening deterioration of the following conditions: CNS failure or compromise and circulatory failure.  Critical care was time spent personally by me on the following activities: evaluation of patient's response to treatment, ordering and performing treatments and interventions, examination of patient, ordering and review of laboratory studies, re-evaluation of patient's condition, discussions with consultants and ordering and review of radiographic studies.               ED Course  ED Course   Comment By Time   I repeat exam Mrs. Romero tells me she feels nauseous and weak.  Repeat blood pressure is 106 systolic.  I spoke with her and her son about findings so far and need for admission.  I have ordered a bolus of fluids.  I have paged the hospitalist on-call. Olivier Marshall MD 07/15 8271   When Dr. Haywood went to see Mrs. Cano he noted that she was having trouble speaking.  He alerted me.  I went back and reexamined her and she is unable to speak.  She does follow multistep commands and seems to understand everything I tell her.  Her  and finger to nose testing are symmetric.  She has however become a phasic.  Her blood pressures have been in the 140s.  I spoke with Dr. Quintanilla who is on-call for neurology.  He asked that we obtain a CT perfusion study as well as CT angiograms.  We discussed her renal function and he feels this is a neurologic emergency and feels that the benefit outweighs the potential risk.  I have ordered an additional bag of IV fluids.  I have spoken with Janice and her son Olivier Marshall MD 07/15 3044   I spoke with the radiologist regarding Mrs. Romero's CAT scans and he will read them soon as they're available.  I accompanied her back to the  emergency department.  Now she is having additional vomiting and a little diarrhea.  She is speaking now Olivier Marshall MD 07/15 6565   Her perfusion study is normal.  I spoke with Dr. Quintanilla.  We will proceed with admission. Olivier Marshall MD 07/15 142                 Recent Results (from the past 24 hour(s))   Comprehensive Metabolic Panel    Collection Time: 07/15/17 10:40 AM   Result Value Ref Range    Glucose 105 (H) 70 - 100 mg/dL    BUN 24 (H) 9 - 23 mg/dL    Creatinine 1.10 0.60 - 1.30 mg/dL    Sodium 133 132 - 146 mmol/L    Potassium 4.3 3.5 - 5.5 mmol/L    Chloride 101 99 - 109 mmol/L    CO2 23.0 20.0 - 31.0 mmol/L    Calcium 10.0 8.7 - 10.4 mg/dL    Total Protein 8.6 (H) 5.7 - 8.2 g/dL    Albumin 3.90 3.20 - 4.80 g/dL    ALT (SGPT) 16 7 - 40 U/L    AST (SGOT) 29 0 - 33 U/L    Alkaline Phosphatase 115 (H) 25 - 100 U/L    Total Bilirubin 0.2 (L) 0.3 - 1.2 mg/dL    eGFR Non African Amer 49 (L) >60 mL/min/1.73    Globulin 4.7 gm/dL    A/G Ratio 0.8 (L) 1.5 - 2.5 g/dL    BUN/Creatinine Ratio 21.8 7.0 - 25.0    Anion Gap 9.0 3.0 - 11.0 mmol/L   Lipase    Collection Time: 07/15/17 10:40 AM   Result Value Ref Range    Lipase 66 (H) 6 - 51 U/L   BNP    Collection Time: 07/15/17 10:40 AM   Result Value Ref Range    .0 (H) 0.0 - 100.0 pg/mL   Light Blue Top    Collection Time: 07/15/17 10:40 AM   Result Value Ref Range    Extra Tube hold for add-on    Green Top (Gel)    Collection Time: 07/15/17 10:40 AM   Result Value Ref Range    Extra Tube Hold for add-ons.    Lavender Top    Collection Time: 07/15/17 10:40 AM   Result Value Ref Range    Extra Tube hold for add-on    Gold Top - SST    Collection Time: 07/15/17 10:40 AM   Result Value Ref Range    Extra Tube Hold for add-ons.    CBC Auto Differential    Collection Time: 07/15/17 10:40 AM   Result Value Ref Range    WBC 5.47 3.50 - 10.80 10*3/mm3    RBC 3.68 (L) 3.89 - 5.14 10*6/mm3    Hemoglobin 11.1 (L) 11.5 - 15.5 g/dL    Hematocrit 33.2 (L) 34.5 -  44.0 %    MCV 90.2 80.0 - 99.0 fL    MCH 30.2 27.0 - 31.0 pg    MCHC 33.4 32.0 - 36.0 g/dL    RDW 12.4 11.3 - 14.5 %    RDW-SD 40.8 37.0 - 54.0 fl    MPV 10.2 6.0 - 12.0 fL    Platelets 242 150 - 450 10*3/mm3    Neutrophil % 74.2 (H) 41.0 - 71.0 %    Lymphocyte % 15.9 (L) 24.0 - 44.0 %    Monocyte % 7.7 0.0 - 12.0 %    Eosinophil % 2.0 0.0 - 3.0 %    Basophil % 0.2 0.0 - 1.0 %    Immature Grans % 0.0 0.0 - 0.6 %    Neutrophils, Absolute 4.06 1.50 - 8.30 10*3/mm3    Lymphocytes, Absolute 0.87 0.60 - 4.80 10*3/mm3    Monocytes, Absolute 0.42 0.00 - 1.00 10*3/mm3    Eosinophils, Absolute 0.11 0.00 - 0.30 10*3/mm3    Basophils, Absolute 0.01 0.00 - 0.20 10*3/mm3    Immature Grans, Absolute 0.00 0.00 - 0.03 10*3/mm3   POC Troponin, Rapid    Collection Time: 07/15/17 10:45 AM   Result Value Ref Range    Troponin I 0.00 0.00 - 0.07 ng/mL   Urinalysis With / Culture If Indicated    Collection Time: 07/15/17 11:58 AM   Result Value Ref Range    Color, UA Yellow Yellow, Straw    Appearance, UA Clear Clear    pH, UA 6.0 5.0 - 8.0    Specific Gravity, UA <=1.005 1.001 - 1.030    Glucose, UA Negative Negative    Ketones, UA Negative Negative    Bilirubin, UA Negative Negative    Blood, UA Negative Negative    Protein, UA 30 mg/dL (1+) (A) Negative    Leuk Esterase, UA Negative Negative    Nitrite, UA Negative Negative    Urobilinogen, UA 0.2 E.U./dL 0.2 - 1.0 E.U./dL   Urinalysis, Microscopic Only    Collection Time: 07/15/17 11:58 AM   Result Value Ref Range    RBC, UA 0-2 None Seen, 0-2 /HPF    WBC, UA None Seen None Seen /HPF    Bacteria, UA None Seen None Seen, Trace /HPF    Squamous Epithelial Cells, UA None Seen None Seen, 0-2 /HPF    Hyaline Casts, UA None Seen 0 - 6 /LPF    Methodology Automated Microscopy    POC Troponin, Rapid    Collection Time: 07/15/17  1:00 PM   Result Value Ref Range    Troponin I 0.00 0.00 - 0.07 ng/mL     Note: In addition to lab results from this visit, the labs listed above may include labs  taken at another facility or during a different encounter within the last 24 hours. Please correlate lab times with ED admission and discharge times for further clarification of the services performed during this visit.    CT Angiogram Head With & Without Contrast   Preliminary Result   Normal intracranial CTA.       DICTATED:     07/15/2017   EDITED:         07/15/2017              CT Angiogram Neck With & Without Contrast   Preliminary Result   Normal CT angiogram of the neck. There is no carotid   occlusive disease.       DICTATED:     07/15/2017   EDITED:         07/15/2017              CT Cerebral Perfusion With & Without Contrast   Preliminary Result   Negative perfusion imaging of reversible neural ischemia.       DICTATED:     07/15/2017   EDITED:         07/15/2017          CT Head Without Contrast   Final Result   There are no acute findings.       DICTATED:     07/15/2017   EDITED:         07/15/2017           This report was finalized on 7/15/2017 1:07 PM by Dr. Beka Lui MD.          XR Chest 1 View    (Results Pending)     Vitals:    07/15/17 1240 07/15/17 1300 07/15/17 1420 07/15/17 1440   BP: 139/63 142/60 168/97    Pulse:       Resp:    18   Temp:    98.1 °F (36.7 °C)   TempSrc:       SpO2: 100% 100% 100%    Weight:       Height:         Medications   sodium chloride 0.9 % flush 10 mL (not administered)   clopidogrel (PLAVIX) tablet 75 mg (not administered)   meclizine (ANTIVERT) tablet 12.5 mg (not administered)   pantoprazole (PROTONIX) EC tablet 40 mg (not administered)   sodium chloride 0.9 % flush 1-10 mL (not administered)   atorvastatin (LIPITOR) tablet 80 mg (not administered)   heparin (porcine) 5000 UNIT/ML injection 5,000 Units (not administered)   sodium chloride 0.9 % bolus 2,000 mL (2,000 mL Intravenous Not Given 7/15/17 1529)   sodium chloride 0.9 % infusion (100 mL/hr Intravenous Restarted 7/15/17 1545)   Pharmacy Meds to Bed Consult (not administered)   aspirin chewable tablet  324 mg (324 mg Oral Given 7/15/17 1305)   hydrALAZINE (APRESOLINE) injection 20 mg (20 mg Intravenous Given 7/15/17 1136)   sodium chloride 0.9 % bolus 1,000 mL (0 mL Intravenous Stopped 7/15/17 1343)   ondansetron (ZOFRAN) injection 4 mg (4 mg Intravenous Given 7/15/17 1258)   sodium chloride 0.9 % bolus 1,000 mL (1,000 mL Intravenous New Bag 7/15/17 1406)   iopamidol (ISOVUE-300) 61 % injection 115 mL (115 mL Intravenous Given 7/15/17 1348)     ECG/EMG Results (last 24 hours)     Procedure Component Value Units Date/Time    ECG 12 Lead [[id number]] Collected:  07/15/17 1011     Updated:  07/15/17 1011            MDM  Number of Diagnoses or Management Options  Chest pain, unspecified type: new and requires workup  History of coronary artery disease: established and worsening  Hypertensive crisis: new and requires workup  Other specified transient cerebral ischemias: new and requires workup     Amount and/or Complexity of Data Reviewed  Clinical lab tests: reviewed and ordered  Tests in the radiology section of CPT®: ordered and reviewed  Discuss the patient with other providers: yes  Independent visualization of images, tracings, or specimens: yes    Critical Care  Total time providing critical care: 30-74 minutes    Patient Progress  Patient progress: improved      Final diagnoses:   Hypertensive crisis   Chest pain, unspecified type   History of coronary artery disease   Other specified transient cerebral ischemias       Documentation assistance provided by maritza Carrasco.  Information recorded by the scribe was done at my direction and has been verified and validated by me.     Katelin Carrasco  07/15/17 1756       Olivier Marshall MD  07/15/17 0935

## 2017-07-16 LAB
APTT PPP: 35 SECONDS (ref 45–60)
APTT PPP: 49 SECONDS (ref 45–60)
APTT PPP: 49.5 SECONDS (ref 45–60)
APTT PPP: 58.1 SECONDS (ref 45–60)
ARTICHOKE IGE QN: 84 MG/DL (ref 0–130)
BASOPHILS # BLD AUTO: 0 10*3/MM3 (ref 0–0.2)
BASOPHILS NFR BLD AUTO: 0 % (ref 0–1)
CHOLEST SERPL-MCNC: 150 MG/DL (ref 0–200)
DEPRECATED RDW RBC AUTO: 41.7 FL (ref 37–54)
EOSINOPHIL # BLD AUTO: 0.01 10*3/MM3 (ref 0–0.3)
EOSINOPHIL NFR BLD AUTO: 0.2 % (ref 0–3)
ERYTHROCYTE [DISTWIDTH] IN BLOOD BY AUTOMATED COUNT: 12.6 % (ref 11.3–14.5)
HBA1C MFR BLD: 5.7 % (ref 4.8–5.6)
HCT VFR BLD AUTO: 28 % (ref 34.5–44)
HDLC SERPL-MCNC: 30 MG/DL (ref 40–60)
HGB BLD-MCNC: 9.2 G/DL (ref 11.5–15.5)
IMM GRANULOCYTES # BLD: 0 10*3/MM3 (ref 0–0.03)
IMM GRANULOCYTES NFR BLD: 0 % (ref 0–0.6)
INR PPP: 1.04
LYMPHOCYTES # BLD AUTO: 1 10*3/MM3 (ref 0.6–4.8)
LYMPHOCYTES NFR BLD AUTO: 23.5 % (ref 24–44)
MCH RBC QN AUTO: 30 PG (ref 27–31)
MCHC RBC AUTO-ENTMCNC: 32.9 G/DL (ref 32–36)
MCV RBC AUTO: 91.2 FL (ref 80–99)
MONOCYTES # BLD AUTO: 0.36 10*3/MM3 (ref 0–1)
MONOCYTES NFR BLD AUTO: 8.5 % (ref 0–12)
NEUTROPHILS # BLD AUTO: 2.88 10*3/MM3 (ref 1.5–8.3)
NEUTROPHILS NFR BLD AUTO: 67.8 % (ref 41–71)
PA ADP PRP-ACNC: 323 PRU
PLATELET # BLD AUTO: 194 10*3/MM3 (ref 150–450)
PMV BLD AUTO: 10 FL (ref 6–12)
PROTHROMBIN TIME: 11.3 SECONDS (ref 9.6–11.5)
RBC # BLD AUTO: 3.07 10*6/MM3 (ref 3.89–5.14)
TRIGL SERPL-MCNC: 109 MG/DL (ref 0–150)
TROPONIN I SERPL-MCNC: 1.54 NG/ML
TROPONIN I SERPL-MCNC: 2.65 NG/ML
WBC NRBC COR # BLD: 4.25 10*3/MM3 (ref 3.5–10.8)

## 2017-07-16 PROCEDURE — 25010000002 HEPARIN (PORCINE) PER 1000 UNITS: Performed by: NURSE PRACTITIONER

## 2017-07-16 PROCEDURE — 99233 SBSQ HOSP IP/OBS HIGH 50: CPT | Performed by: INTERNAL MEDICINE

## 2017-07-16 PROCEDURE — 80061 LIPID PANEL: CPT | Performed by: HOSPITALIST

## 2017-07-16 PROCEDURE — 92523 SPEECH SOUND LANG COMPREHEN: CPT

## 2017-07-16 PROCEDURE — 85576 BLOOD PLATELET AGGREGATION: CPT | Performed by: PSYCHIATRY & NEUROLOGY

## 2017-07-16 PROCEDURE — 99221 1ST HOSP IP/OBS SF/LOW 40: CPT | Performed by: INTERNAL MEDICINE

## 2017-07-16 PROCEDURE — 93010 ELECTROCARDIOGRAM REPORT: CPT | Performed by: INTERNAL MEDICINE

## 2017-07-16 PROCEDURE — 99231 SBSQ HOSP IP/OBS SF/LOW 25: CPT | Performed by: PSYCHIATRY & NEUROLOGY

## 2017-07-16 PROCEDURE — 85610 PROTHROMBIN TIME: CPT | Performed by: NURSE PRACTITIONER

## 2017-07-16 PROCEDURE — 85025 COMPLETE CBC W/AUTO DIFF WBC: CPT | Performed by: NURSE PRACTITIONER

## 2017-07-16 PROCEDURE — 83036 HEMOGLOBIN GLYCOSYLATED A1C: CPT | Performed by: HOSPITALIST

## 2017-07-16 PROCEDURE — 84484 ASSAY OF TROPONIN QUANT: CPT | Performed by: NURSE PRACTITIONER

## 2017-07-16 PROCEDURE — 85730 THROMBOPLASTIN TIME PARTIAL: CPT

## 2017-07-16 PROCEDURE — 85730 THROMBOPLASTIN TIME PARTIAL: CPT | Performed by: NURSE PRACTITIONER

## 2017-07-16 PROCEDURE — 97162 PT EVAL MOD COMPLEX 30 MIN: CPT

## 2017-07-16 PROCEDURE — 97166 OT EVAL MOD COMPLEX 45 MIN: CPT

## 2017-07-16 RX ORDER — HEPARIN SODIUM 1000 [USP'U]/ML
60 INJECTION, SOLUTION INTRAVENOUS; SUBCUTANEOUS AS NEEDED
Status: DISCONTINUED | OUTPATIENT
Start: 2017-07-16 | End: 2017-07-16 | Stop reason: DRUGHIGH

## 2017-07-16 RX ORDER — PRASUGREL 10 MG/1
10 TABLET, FILM COATED ORAL DAILY
Status: DISCONTINUED | OUTPATIENT
Start: 2017-07-17 | End: 2017-07-18 | Stop reason: HOSPADM

## 2017-07-16 RX ORDER — HEPARIN SODIUM 1000 [USP'U]/ML
30 INJECTION, SOLUTION INTRAVENOUS; SUBCUTANEOUS AS NEEDED
Status: DISCONTINUED | OUTPATIENT
Start: 2017-07-16 | End: 2017-07-16 | Stop reason: DRUGHIGH

## 2017-07-16 RX ADMIN — PANTOPRAZOLE SODIUM 40 MG: 40 TABLET, DELAYED RELEASE ORAL at 08:15

## 2017-07-16 RX ADMIN — ATORVASTATIN CALCIUM 80 MG: 40 TABLET, FILM COATED ORAL at 21:11

## 2017-07-16 RX ADMIN — CLOPIDOGREL BISULFATE 75 MG: 75 TABLET ORAL at 08:16

## 2017-07-16 RX ADMIN — HEPARIN SODIUM 12 UNITS/KG/HR: 10000 INJECTION, SOLUTION INTRAVENOUS at 01:00

## 2017-07-16 NOTE — PLAN OF CARE
Problem: Patient Care Overview (Adult)  Goal: Plan of Care Review  Outcome: Outcome(s) achieved Date Met:  07/16/17 07/16/17 1054   Coping/Psychosocial Response Interventions   Plan Of Care Reviewed With patient   Outcome Evaluation   Outcome Summary/Follow up Plan OT IE completed. Pt symptoms/R side weakness resolved. Pt appears at baseline function. Pt appropriate to return home at d/c. OT to d/c at this time as skilled intervention is not indicated.         Problem: Inpatient Occupational Therapy  Goal: Patient Education Goal STG- OT  Outcome: Ongoing (interventions implemented as appropriate)    07/16/17 1054   Patient Education OT STG   Patient Education OT STG, Time to Achieve by discharge   Patient Education OT STG, Education Type home safety;aware of neuro deficits   Patient Education OT STG, Education Understanding verbalizes understanding;demonstrates adequately   Patient Education OT STG Outcome goal met

## 2017-07-16 NOTE — PROGRESS NOTES
"      HOSPITALIST DAILY PROGRESS NOTE    Chief Complaint: weakness    Subjective   SUBJECTIVE/OVERNIGHT EVENTS   No acute events overnight.Patient states that she feels better,denies any more weakness, no CP or palpitations    Review of Systems:  Gen-no fevers, no chills  CV-no chest pain, no palpitations  Resp-no cough, no dyspnea  GI-no N/V/D, no abd pain    Objective   OBJECTIVE   I have reviewed the vital signs.  /62 (BP Location: Right arm, Patient Position: Lying)  Pulse 61  Temp 98.9 °F (37.2 °C) (Oral)   Resp 16  Ht 61\" (154.9 cm)  Wt 120 lb (54.4 kg)  SpO2 100%  BMI 22.67 kg/m2    Physical Exam:  Gen-no acute distress, comfortable in chair  CV-RRR, S1 S2 normal, no m/r/g  Resp-CTAB, no wheezes  Abd-soft, NT, ND, +BS  Ext-no edema  Neuro-A&Ox3, no focal deficits  Psych-appropriate mood    Results:  I have reviewed the labs, Radiology results, and diagnostic studies.      Results from last 7 days  Lab Units 07/16/17  0032 07/15/17  1040   WBC 10*3/mm3 4.25 5.47   HEMOGLOBIN g/dL 9.2* 11.1*   HEMATOCRIT % 28.0* 33.2*   PLATELETS 10*3/mm3 194 242       Results from last 7 days  Lab Units 07/15/17  1040   SODIUM mmol/L 133   POTASSIUM mmol/L 4.3   CHLORIDE mmol/L 101   CO2 mmol/L 23.0   BUN mg/dL 24*   CREATININE mg/dL 1.10   GLUCOSE mg/dL 105*   CALCIUM mg/dL 10.0     Radiology Results:  Imaging Results (last 24 hours)     Procedure Component Value Units Date/Time    CT Head Without Contrast [[id number]] Collected:  07/15/17 1240     Updated:  07/15/17 1309    Narrative:       EXAMINATION: CT HEAD WO CONTRAST - 07/15/2017     INDICATION: Right upper extremity weakness.     TECHNIQUE: CT scan of the head was performed at 5 mm intervals. No  intravenous contrast was utilized.      The radiation dose reduction device was turned on for each scan per the  ALARA (As Low as Reasonably Achievable) protocol.     COMPARISON: MR of the brain dated 10/17/2015.     FINDINGS: There is no intra-axial mass. There " is no hemorrhage. There is  no midline shift or extra-axial fluid collection.There is a tiny lacunar  infarct in the right thalamic region.       Impression:       There are no acute findings.     DICTATED:     07/15/2017  EDITED:         07/15/2017        This report was finalized on 7/15/2017 1:07 PM by Dr. Beka Lui MD.       CT Angiogram Head With & Without Contrast [[id number]] Collected:  07/15/17 1605     Updated:  07/15/17 1702    Narrative:       EXAMINATION: CT ANGIOGRAM HEAD W WO CONTRAST - 07/15/2017     INDICATION:  G45.8-Other transient cerebral ischemic attacks and related  syndromes; I16.9-Hypertensive crisis, unspecified; R07.9-Chest pain,  unspecified; Z86.79-Personal history of other diseases of the  circulatory system.     TECHNIQUE: Intracranial CT angiogram was performed with images acquired  in the axial plane and displayed in the axial as well as the  reconstructed sagittal and coronal projections (3D).     The radiation dose reduction device was turned on for each scan per the  ALARA (As Low as Reasonably Achievable) protocol.     COMPARISON: None.     FINDINGS: The intracranial portion of the internal carotid arteries is  normal. Anterior cerebral arteries and anterior communicating artery are  normal. Both middle cerebral arteries are normal with no stenosis. The  posterior fossa circulation is also normal.       Impression:       Normal intracranial CTA.     DICTATED:     07/15/2017  EDITED:         07/15/2017        This report was finalized on 7/15/2017 5:00 PM by Dr. Beka Lui MD.       CT Cerebral Perfusion With & Without Contrast [[id number]] Collected:  07/15/17 1546     Updated:  07/15/17 1702    Narrative:       EXAMINATION: CT CEREBRAL PERFUSION W/O CONTRAST - 07/15/2017     INDICATION: G45.8-Other transient cerebral ischemic attacks and related  syndromes; I16.9-Hypertensive crisis, unspecified; R07.9-Chest pain,  unspecified; Z86.79-Personal history of other diseases of  the  circulatory system.     TECHNIQUE: Cerebral profusion imaging was obtained of the head without  the administration of intravenous contrast according to the CT perfusion  protocol. Parametric maps were reconstructed to demonstrate cerebral  blood flow, cerebral blood volume, time to drain and mean transit time.     The radiation dose reduction device was turned on for each scan per the  ALARA (As Low as Reasonably Achievable) protocol.     COMPARISON: None.     FINDINGS: There is no significant flow asymmetry on perfusion, mean  transit time and time to maximum flow, and there is no irregularity when  compared with blood volume.        Impression:       Negative perfusion imaging of reversible neural ischemia.     DICTATED:     07/15/2017  EDITED:         07/15/2017     This report was finalized on 7/15/2017 5:00 PM by Dr. Beka Lui MD.       CT Angiogram Neck With & Without Contrast [[id number]] Collected:  07/15/17 1602     Updated:  07/15/17 1702    Narrative:       EXAMINATION: CT ANGIOGRAM NECK W WO CONTRAST - 07/15/2017     INDICATION: Aphasia; G45.8-Other transient cerebral ischemic attacks and  related syndromes; I16.9-Hypertensive crisis, unspecified; R07.9-Chest  pain, unspecified; Z86.79-Personal history of other diseases of the  circulatory system.     TECHNIQUE: CT angiogram of the neck was performed prior to and following  intravenous contrast. Images are displayed in the axial, coronal and  three-dimensional projections.     The radiation dose reduction device was turned on for each scan per the  ALARA (As Low as Reasonably Achievable) protocol.     COMPARISON: None.     FINDINGS: Both common carotid arteries are normal. There is no  calcification of the carotid arteries and the bifurcations are widely  patent with no stenosis. Both vertebral arteries are patent as is the  basilar artery.       Impression:       Normal CT angiogram of the neck. There is no carotid  occlusive disease.      DICTATED:     07/15/2017  EDITED:         07/15/2017        This report was finalized on 7/15/2017 5:00 PM by Dr. Beka Lui MD.       XR Chest 1 View [[id number]] Collected:  07/15/17 1624     Updated:  07/15/17 1704    Narrative:          EXAMINATION: XR CHEST, SINGLE VIEW - 07/15/2017     INDICATION: Chest pain.     COMPARISON: 10/17/2015.     FINDINGS: The heart size is normal. There is no pulmonary inflammatory  process. There is no mass or effusion.           Impression:       No active disease.     DICTATED:     07/15/2017  EDITED:         07/15/2017     This report was finalized on 7/15/2017 5:02 PM by Dr. Beka Lui MD.       MRI Brain Without Contrast [[id number]] Collected:  07/16/17 1137     Updated:  07/16/17 1158    Narrative:       EXAMINATION: MRI BRAIN WO CONTRAST - 07/16/2017     INDICATION: TIA; G45.8-Other transient cerebral ischemic attacks and  related syndromes; I16.9-Hypertensive crisis, unspecified; R07.9-Chest  pain, unspecified; Z86.79-Personal history of other diseases of the  circulatory system.     TECHNIQUE: Sagittal, axial and coronal MR imaging of the brain was  performed at 5 mm intervals in the axial plane without intravenous  contrast.       COMPARISON: None.     FINDINGS:  There is a tiny old left cerebellar infarct. There are  periventricular white matter changes typical of aging. There is no  intracranial mass. There is no hemorrhage. There is no extra-axial fluid  collection. The pituitary gland is normal. There is no acute infarct.       Impression:       There are no acute findings. There is a small old left  cerebellar infarct.     DICTATED:     07/16/2017  EDITED:         07/16/2017         This report was finalized on 7/16/2017 11:56 AM by Dr. Beka Lui MD.             I have reviewed the medications.    Assessment/Plan   ASSESSMENT/PLAN    Active Problems:    Transient cerebral ischemia    CAD (coronary artery disease)    HTN (hypertension)    71 yof admitted 7/16  with right sided weakness and numbness, chest pain and headache with BP in 200s hypertensive concerns for CVA     Plan  - Encephalopathy likely hypertensive encephalopathy, CVA suspected, neurology following. S/p MRI Continue plavix and ASA  - Chest pain with suspected ACS has hx of CAD with stents now with elevated troponin, EKG with LBBB that was unchanged from prior, started on heparin gtt, continue ASA and Plavix cardiology consulted, followed by Deondre.  - Patient with interim development of fever, no leukocytosis no abx started  - DVT ppx- on Heparin gtt  - PT/OT    Dispo: TANJA Valdes MD  07/16/17  12:30 PM

## 2017-07-16 NOTE — CONSULTS
[facility] at [facility]  Consultation H&P    Patient: Janice Romero  1945  [phone]      PCP:  Fred Winter MD   Treatment Team:   Attending Provider: Anshul Valdes MD  Consulting Physician: Diego Casas MD  Admitting Provider: Anshul Valdes MD   7/15/2017      DATE OF CONSULTATION: 7/16/2017 11:14 AM     IDENTIFICATION: A 71 y.o. female Wal [place]   from [place]    REASON FOR CONSULTATION: troponin elevation    PROBLEM LIST:  Active Problems:    Transient cerebral ischemia    CAD (coronary artery disease)    HTN (hypertension)    [facility] Problem List:    1. CAD  2011 Stents [facility] data def  2014?- [facility]- EM after did not require HD  2016 Stent per Dr Woodson -[facility] data def  2. TIA  Multiple recurrent w historical and current neuro evals benign  Noted in setting of labile bp  Migraine HA  3. CKD 2  4. HTN  5. HL statin intolerant  6 R hand weakness post reported squirrel attack without skin break spring 2017  SX HX  Remote ccx. BHARAT      Past Medical History:   Diagnosis Date   • CKD (chronic kidney disease)    • Hypertension    • MI (myocardial infarction)    • Stroke      Past Surgical History:   Procedure Laterality Date   • CHOLECYSTECTOMY     • CORONARY STENT PLACEMENT     • HYSTERECTOMY     • TUBAL ABDOMINAL LIGATION         Allergies  Allergies   Allergen Reactions   • Simvastatin Shortness Of Breath   • Lexapro [Escitalopram] Other (See Comments)     tremors   • Acyclovir And Related Rash   • Altace [Ramipril] Rash   • Aspirin Rash   • Benicar [Olmesartan] Rash   • Bystolic [Nebivolol Hcl] Rash   • Cephalexin Rash   • Codeine Rash   • Dynacirc [Isradipine] Rash   • Factive [Gemifloxacin] Rash   • Levaquin [Levofloxacin In D5w] Rash   • Lisinopril Rash   • Norvasc [Amlodipine] Rash   • Penicillins Rash   • Rocephin [Ceftriaxone] Rash   • Septra [Sulfamethoxazole-Trimethoprim] Rash   • Talwin [Pentazocine] Rash   • Tekturna [Aliskiren] Rash   •  Trimox [Amoxicillin]    • Verelan [Verapamil Hcl Er] Rash   • Zithromax [Azithromycin] Rash   • Zomig [Zolmitriptan] Rash       Current Medications    Current Facility-Administered Medications:   •  acetaminophen (TYLENOL) tablet 650 mg, 650 mg, Oral, Q6H PRN, MIGUEL ÁNGEL Ferrer, 650 mg at 07/15/17 2239  •  atorvastatin (LIPITOR) tablet 80 mg, 80 mg, Oral, Nightly, Asad Voss MD, 80 mg at 07/15/17 2203  •  clopidogrel (PLAVIX) tablet 75 mg, 75 mg, Oral, Daily, Asad Voss MD, 75 mg at 07/16/17 0816  •  heparin 94131 units/250 mL (100 units/mL) in 0.45 % NaCl infusion, 12 Units/kg/hr, Intravenous, Titrated, MIGUEL ÁNGEL Ferrer, Last Rate: 6.52 mL/hr at 07/16/17 0100, 12 Units/kg/hr at 07/16/17 0100  •  meclizine (ANTIVERT) tablet 12.5 mg, 12.5 mg, Oral, TID PRN, Asad Voss MD  •  pantoprazole (PROTONIX) EC tablet 40 mg, 40 mg, Oral, QAM, Asad Voss MD, 40 mg at 07/16/17 0815  •  [START ON 7/17/2017] Pharmacy Meds to Bed Consult, , Does not apply, Daily, PETRA Holland [facility]  •  Pharmacy to Dose Heparin, , Does not apply, Continuous PRN, Diego Ferguson [facility]  •  sodium chloride 0.9 % bolus 2,000 mL, 2,000 mL, Intravenous, Once, Asad Voss MD  •  sodium chloride 0.9 % flush 1-10 mL, 1-10 mL, Intravenous, PRN, Asad Voss MD  •  sodium chloride 0.9 % flush 10 mL, 10 mL, Intravenous, PRN, Olivier Marshall MD  •  sodium chloride 0.9 % infusion, 100 mL/hr, Intravenous, Continuous, Asad Voss MD, Last Rate: 100 mL/hr at 07/15/17 1545, 100 mL/hr at 07/15/17 1545    heparin 12 Units/kg/hr Last Rate: 12 Units/kg/hr (07/16/17 0100)   Pharmacy to Dose Heparin     sodium chloride 100 mL/hr Last Rate: 100 mL/hr (07/15/17 6642)       History of Present Illness Patient presents to [facility] ER with 2 hour of expressive aphasia and right sided weakness.  She states she did have one episode of chest discomfort and took 1 nitroglycerin.  She has taken nitroglycerin previously without subsequent  neurologic compromise.  She has complicated and long-standing history of recurrent non-ST MI and multiple historical catheterizations with multivessel stenting per her report.  Unfortunately all of these procedures have been performed at outlying hospitals.  She has had history of recurrent TIA that has resolved with a hospitalization 2015 at [facility] with similar presentation.  Historically her neurologic evaluations have been benign.  She was at work yesterday as a [employer]  where she has worked for 13 years.  There was no change in her routine or medications when her symptoms began.  She stated the  aphasia and right hand and leg weakness persisted until she presented to [facility] ER.  Neurologic workup to date has been been benign her MRI is pending.  She did have serial troponins drawn which have trended to the positive range of greater than 2.  She has baseline left bundle branch block.  She has historically follow with Dr. Woodson in [place] and is asking to transfer her care to [place].  She admits to a similar presentation within the last one year and subsequently had yet another Heart catheterization and stent placed.      ROS  Review of Systems   Constitution: Negative for chills, fever, weakness, malaise/fatigue, night sweats, weight gain and weight loss.   HENT: Negative for headaches, hearing loss and nosebleeds.    Eyes: Negative for blurred vision, vision loss in left eye, vision loss in right eye, visual disturbance and visual halos.   Cardiovascular: Positive for chest pain. Negative for claudication, cyanosis, dyspnea on exertion, irregular heartbeat, leg swelling, near-syncope, orthopnea, palpitations, paroxysmal nocturnal dyspnea and syncope.   Respiratory: Negative for cough, hemoptysis, shortness of breath, snoring and wheezing.    Endocrine: Negative for cold intolerance, heat intolerance, polydipsia, polyphagia and polyuria.   Hematologic/Lymphatic: Negative for  adenopathy and bleeding problem. Does not bruise/bleed easily.   Skin: Negative for dry skin, poor wound healing and rash.   Musculoskeletal: Negative for falls, joint pain, joint swelling, muscle cramps, muscle weakness, myalgias and neck pain.   Gastrointestinal: Negative for bloating, abdominal pain, change in bowel habit, bowel incontinence, constipation, diarrhea, dysphagia, excessive appetite, heartburn, hematemesis, hematochezia, jaundice, melena, nausea and vomiting.   Genitourinary: Negative for bladder incontinence, dysuria, flank pain, hematuria, hesitancy and nocturia.   Neurological: Positive for aphonia, brief paralysis and focal weakness. Negative for excessive daytime sleepiness, dizziness, light-headedness, loss of balance, seizures, sensory change, tremors and vertigo.   Psychiatric/Behavioral: Negative for altered mental status, depression, memory loss, substance abuse and suicidal ideas. The patient is not nervous/anxious.    All other systems reviewed and are negative.      SOCIAL HX  Social History     Social History   • Marital status:      Spouse name: N/A   • Number of children: N/A   • Years of education: N/A     Occupational History   • Not on file.     Social History Main Topics   • Smoking status: Never Smoker   • Smokeless tobacco: Not on file   • Alcohol use No   • Drug use: No   • Sexual activity: Defer     Other Topics Concern   • Not on file     Social History Narrative   • No narrative on file       FAMILY HX  History reviewed. No pertinent family history.    OBJECTIVE:  Vitals:    07/15/17 2225 07/15/17 2334 07/16/17 0344 07/16/17 0810   BP: 146/73 125/61 125/69 149/62   BP Location: Left arm Left arm Right arm Right arm   Patient Position: Lying Lying Lying Lying   Pulse:  73 57 61   Resp: 16 14 16 16   Temp: 99.9 °F (37.7 °C) 99.7 °F (37.6 °C) 98.2 °F (36.8 °C) 98.9 °F (37.2 °C)   TempSrc: Oral Oral Oral Oral   SpO2:  99% 100% 100%   Weight:       Height:         I/O  last 3 completed shifts:  In: 480 [P.O.:480]  Out: 650 [Urine:650]     Intake & Output (last 3 days)       07/13 0701 - 07/14 0700 07/14 0701 - 07/15 0700 07/15 0701 - 07/16 0700 07/16 0701 - 07/17 0700    P.O.   480     Total Intake(mL/kg)   480 (8.8)     Urine (mL/kg/hr)   650     Total Output     650      Net     -170                     PHYSICAL EXAMINATION:  Physical Exam   Constitutional: She is oriented to person, place, and time. She appears well-developed and well-nourished. No distress.   HENT:   Head: Normocephalic and atraumatic.   Nose: Nose normal.   Mouth/Throat: Uvula is midline, oropharynx is clear and moist and mucous membranes are normal.   Eyes: Conjunctivae and EOM are normal. Pupils are equal, round, and reactive to light. No scleral icterus.   Neck: Normal range of motion. Neck supple. No hepatojugular reflux and no JVD present. Carotid bruit is not present. No tracheal deviation present. No thyromegaly present.   Cardiovascular: Normal rate, regular rhythm, S1 normal, S2 normal, intact distal pulses and normal pulses.  PMI is not displaced.  Exam reveals no gallop, no distant heart sounds, no friction rub, no midsystolic click and no opening snap.    No murmur heard.  Pulses:       Radial pulses are 2+ on the right side, and 2+ on the left side.        Dorsalis pedis pulses are 2+ on the right side, and 2+ on the left side.        Posterior tibial pulses are 2+ on the right side, and 2+ on the left side.   Pulmonary/Chest: Effort normal and breath sounds normal. She has no wheezes. She has no rhonchi. She has no rales.   Abdominal: Soft. Bowel sounds are normal. She exhibits no mass. There is no tenderness. There is no guarding.   Musculoskeletal: She exhibits no edema or tenderness.   Lymphadenopathy:     She has no cervical adenopathy.   Neurological: She is alert and oriented to person, place, and time.   R hand weak    Skin: Skin is warm, dry and intact. No rash noted. No cyanosis or  erythema. Nails show no clubbing.   Psychiatric: She has a normal mood and affect. Her behavior is normal.   Nursing note and vitals reviewed.      Diagnostic Data:  Lab Results (last 24 hours)     Procedure Component Value Units Date/Time    Lipase [[id number]]  (Abnormal) Collected:  07/15/17 1040    Specimen:  Blood Updated:  07/15/17 1119     Lipase 66 (H) U/L     BNP [[id number]]  (Abnormal) Collected:  07/15/17 1040    Specimen:  Blood Updated:  07/15/17 1137     .0 (H) pg/mL     Light Blue Top [[id number]] Collected:  07/15/17 1040    Specimen:  Blood Updated:  07/15/17 1201     Extra Tube hold for add-on      Auto resulted       Green Top (Gel) [[id number]] Collected:  07/15/17 1040    Specimen:  Blood Updated:  07/15/17 1201     Extra Tube Hold for add-ons.      Auto resulted.       Lavender Top [[id number]] Collected:  07/15/17 1040    Specimen:  Blood Updated:  07/15/17 1201     Extra Tube hold for add-on      Auto resulted       Gold Top - SST [[id number]] Collected:  07/15/17 1040    Specimen:  Blood Updated:  07/15/17 1201     Extra Tube Hold for add-ons.      Auto resulted.       Urinalysis With / Culture If Indicated [[id number]]  (Abnormal) Collected:  07/15/17 1158    Specimen:  Urine from Urine, Clean Catch Updated:  07/15/17 1222     Color, UA Yellow     Appearance, UA Clear     pH, UA 6.0     Specific Gravity, UA <=1.005     Glucose, UA Negative     Ketones, UA Negative     Bilirubin, UA Negative     Blood, UA Negative     Protein, UA 30 mg/dL (1+) (A)     Leuk Esterase, UA Negative     Nitrite, UA Negative     Urobilinogen, UA 0.2 E.U./dL    Urinalysis, Microscopic Only [[id number]] Collected:  07/15/17 1158    Specimen:  Urine from Urine, Clean Catch Updated:  07/15/17 1222     RBC, UA 0-2 /HPF      WBC, UA None Seen /HPF      Bacteria, UA None Seen /HPF      Squamous Epithelial Cells, UA None Seen /HPF      Hyaline Casts, UA None Seen /LPF      Methodology Automated Microscopy    POC Troponin, Rapid  [[id number]]  (Normal) Collected:  07/15/17 1300    Specimen:  Blood Updated:  07/15/17 1320     Troponin I 0.00 ng/mL       Serial Number: [id number]:  [id number]       [place] Draw [[id number]] Collected:  07/15/17 1040    Specimen:  Blood Updated:  07/15/17 1328    Narrative:       The following orders were created for panel order [place] Draw.  Procedure                               Abnormality         Status                     ---------                               -----------         ------                     Light Blue Top[[id number]]                                    Final result               Green Top (Gel)[[id number]]                                   Final result               Lavender Top[[id number]]                                      Final result               Gold Top - SST[[id number]]                                    Final result               Green Top (No Gel)[[id number]]                                                             Please view results for these tests on the individual orders.    Troponin [[id number]]  (Normal) Collected:  07/15/17 1554    Specimen:  Blood Updated:  07/15/17 1634     Troponin I 0.032 ng/mL     Narrative:       Ultra Troponin I Reference Range:    <=0.039 ng/mL: Negative  0.04-0.779 ng/mL: Indeterminate Range. Clinical correlation required.  >=0.78  ng/mL: Consistent with myocardial injury. Clinical correlation required.    TSH [[id number]]  (Normal) Collected:  07/15/17 1554    Specimen:  Blood Updated:  07/15/17 1634     TSH 1.471 mIU/mL     Troponin [[id number]]  (Abnormal) Collected:  07/15/17 1924    Specimen:  Blood Updated:  07/15/17 2017     Troponin I 0.547 (H) ng/mL     Narrative:       Ultra Troponin I Reference Range:    <=0.039 ng/mL: Negative  0.04-0.779 ng/mL: Indeterminate Range. Clinical correlation required.  >=0.78  ng/mL: Consistent with myocardial injury. Clinical correlation required.    Troponin [[id number]]  (Abnormal) Collected:  07/15/17 2224     Specimen:  Blood Updated:  07/16/17 0013     Troponin I 1.540 (C) ng/mL     Narrative:       Ultra Troponin I Reference Range:    <=0.039 ng/mL: Negative  0.04-0.779 ng/mL: Indeterminate Range. Clinical correlation required.  >=0.78  ng/mL: Consistent with myocardial injury. Clinical correlation required.    CBC & Differential [[id number]] Collected:  07/16/17 0032    Specimen:  Blood Updated:  07/16/17 0053    Narrative:       The following orders were created for panel order CBC & Differential.  Procedure                               Abnormality         Status                     ---------                               -----------         ------                     CBC Auto Differential[[id number]]        Abnormal            Final result                 Please view results for these tests on the individual orders.    CBC Auto Differential [[id number]]  (Abnormal) Collected:  07/16/17 0032    Specimen:  Blood Updated:  07/16/17 0053     WBC 4.25 10*3/mm3      RBC 3.07 (L) 10*6/mm3      Hemoglobin 9.2 (L) g/dL      Hematocrit 28.0 (L) %      MCV 91.2 fL      MCH 30.0 pg      MCHC 32.9 g/dL      RDW 12.6 %      RDW-SD 41.7 fl      MPV 10.0 fL      Platelets 194 10*3/mm3      Neutrophil % 67.8 %      Lymphocyte % 23.5 (L) %      Monocyte % 8.5 %      Eosinophil % 0.2 %      Basophil % 0.0 %      Immature Grans % 0.0 %      Neutrophils, Absolute 2.88 10*3/mm3      Lymphocytes, Absolute 1.00 10*3/mm3      Monocytes, Absolute 0.36 10*3/mm3      Eosinophils, Absolute 0.01 10*3/mm3      Basophils, Absolute 0.00 10*3/mm3      Immature Grans, Absolute 0.00 10*3/mm3     Protime-INR [[id number]] Collected:  07/16/17 0032    Specimen:  Blood Updated:  07/16/17 0201     Protime 11.3 Seconds      INR 1.04    Narrative:       Therapeutic Ranges for INR: 2.0-3.0 (PT 20-30)                              2.5-3.5 (PT 25-34)    aPTT [[id number]]  (Abnormal) Collected:  07/16/17 0032    Specimen:  Blood Updated:  07/16/17 0201     PTT 35.0  (L) seconds     Narrative:       PTT = The equivalent PTT values for the therapeutic range of heparin levels at 0.3 to 0.5 U/ml are 45 to 60 seconds.    Troponin [[id number]]  (Abnormal) Collected:  07/16/17 0032    Specimen:  Blood Updated:  07/16/17 0230     Troponin I 2.645 (C) ng/mL     Narrative:       Ultra Troponin I Reference Range:    <=0.039 ng/mL: Negative  0.04-0.779 ng/mL: Indeterminate Range. Clinical correlation required.  >=0.78  ng/mL: Consistent with myocardial injury. Clinical correlation required.    Lipid Panel [[id number]]  (Abnormal) Collected:  07/16/17 0541    Specimen:  Blood Updated:  07/16/17 0653     Total Cholesterol 150 mg/dL      Triglycerides 109 mg/dL      HDL Cholesterol 30 (L) mg/dL      LDL Cholesterol  84 mg/dL     Narrative:       Cholesterol Reference Ranges:   Desirable       < 200 mg/dL   Borderline    200-239 mg/dL   High Risk       > 239 mg/dL    Triglyceride Reference Ranges:   Normal          < 150 mg/dL   Borderline    150-199 mg/dL   High          200-499 mg/dL   Very High       > 499 mg/dL    HDL Reference Ranges:   Low              < 40 mg/dL   High             > 59 mg/dL    LDL Reference Ranges:   Optimal         < 100 mg/dL   Near Optimal  100-129 mg/dL   Borderline    130-159 mg/dL   High          160-189 mg/dL   Very High       > 189 mg/dL    aPTT [[id number]]  (Normal) Collected:  07/16/17 0541    Specimen:  Blood Updated:  07/16/17 0722     PTT 58.1 seconds     Narrative:       PTT = The equivalent PTT values for the therapeutic range of heparin levels at 0.3 to 0.5 U/ml are 45 to 60 seconds.  Verified by repeat analysis.    Hemoglobin A1c [[id number]]  (Abnormal) Collected:  07/16/17 0541    Specimen:  Blood Updated:  07/16/17 0757     Hemoglobin A1C 5.70 (H) %     Narrative:       The American Diabetes Association recommends maintenance of Hemoglobin A1C at 7.0% or lower. Goals for Hemoglobin A1C reduction may need to be modified if hypoglycemia is a problem.         ECG/EMG Results (last 24 hours)     Procedure Component Value Units Date/Time    ECG 12 Lead [[id number]] Collected:  07/15/17 1256     Updated:  07/15/17 1256    ECG 12 Lead [[id number]] Collected:  07/16/17 0018     Updated:  07/16/17 0023    Narrative:       Test Reason : elevated troponin  Blood Pressure : **/** mmHG  Vent. Rate : 000 BPM     Atrial Rate : 000 BPM     P-R Int : 000 ms          QRS Dur : 000 ms      QT Int : 000 ms       P-R-T Axes : 000 000 000 degrees     QTc Int : 000 ms    ** No QRS complexes found, no ECG analysis possible **      Referred By:  MADINA           Confirmed By:              Active Problems:    Transient cerebral ischemia    CAD (coronary artery disease)    HTN (hypertension)        ASSESSMENT/PLAN:  Complicated female with neurologic symptoms and concomitant elevation of troponin in setting of historical multivessel coronary stenting.  I discussed options of medical management and noninvasive ischemic evaluation of ischemic evaluation with her.  She has not entirely ruled out the potential of heart catheterization but states that she would only do this under last option.  She is agreeable to proceed with noninvasive ischemic evaluation and if there is a large area of myocardium at risk would then revisit need for invasive ischemic evaluation.  We will continue cardiac isoenzyme trend and if plateaus plan  Lu PET scan in morning.  We will attempt to obtain the patient's most recent catheterization films and primary cardiologist report.    TIA in the setting of labile blood pressure and nitroglycerin utilization of potential for transient hypoperfusion in watershed distribution.  Again neurovascular and imaging evaluation benign at this time.    Anemia chronic with acute exacerbation on current unfractionated heparin if troponin trends down we will discontinue          Diego Casas MD   11:14 AM 7/16/2017

## 2017-07-16 NOTE — PROGRESS NOTES
Neurology Note    Patient:  Janice Romero    YOB: 1945    REFERRING PHYSICIAN:  No ref. provider found    CHIEF COMPLAINT:    weakness    HISTORY OF PRESENT ILLNESS:   The patient is a 71 y.o. female admitted with right sided weakness, confusion, chest pain, SBP of 210, all symptoms resolved. CTP, CTAs negative. Denies new concerns. P2Y12 was over 300.    Past Medical History:  Past Medical History:   Diagnosis Date   • CKD (chronic kidney disease)    • Hypertension    • MI (myocardial infarction)    • Stroke        Past Surgical History:  Past Surgical History:   Procedure Laterality Date   • CHOLECYSTECTOMY     • CORONARY STENT PLACEMENT     • HYSTERECTOMY     • TUBAL ABDOMINAL LIGATION         Social History:   Social History     Social History   • Marital status:      Spouse name: N/A   • Number of children: N/A   • Years of education: N/A     Social History Main Topics   • Smoking status: Never Smoker   • Smokeless tobacco: None   • Alcohol use No   • Drug use: No   • Sexual activity: Defer     Other Topics Concern   • None     Social History Narrative   • None        Family History:   History reviewed. No pertinent family history.    Medications Prior to Admission:    Prior to Admission medications    Medication Sig Start Date End Date Taking? Authorizing Provider   albuterol (PROVENTIL HFA;VENTOLIN HFA) 108 (90 BASE) MCG/ACT inhaler Inhale 2 puffs 4 (Four) Times a Day As Needed for Wheezing.   Yes Historical Provider, MD   aspirin 81 MG EC tablet Take 81 mg by mouth Every 12 (Twelve) Hours.   Yes Historical Provider, MD   carvedilol (COREG) 3.125 MG tablet Take 3.125 mg by mouth 2 (Two) Times a Day With Meals.   Yes Historical Provider, MD   cholecalciferol (VITAMIN D3) 1000 UNITS tablet Take 1,000 Units by mouth Daily.   Yes Historical Provider, MD   clopidogrel (PLAVIX) 75 MG tablet Take 75 mg by mouth Daily.   Yes Historical Provider, MD   Garlic 1000 MG capsule Take 1,000 mg by  mouth Daily.   Yes Historical Provider, MD   meclizine 25 MG chewable tablet chewable tablet Chew 25 mg 3 (Three) Times a Day As Needed.   Yes Historical Provider, MD   Multiple Vitamins-Minerals (MULTIVITAMIN ADULT PO) Take 1 tablet by mouth Daily.   Yes Historical Provider, MD   nitroglycerin (NITROSTAT) 0.4 MG SL tablet Place 0.4 mg under the tongue Every 5 (Five) Minutes As Needed for Chest Pain. Take no more than 3 doses in 15 minutes.   Yes Historical Provider, MD   omeprazole (priLOSEC) 20 MG capsule Take 20 mg by mouth Daily.   Yes Historical Provider, MD   valsartan-hydrochlorothiazide (DIOVAN-HCT) 160-12.5 MG per tablet Take 1 tablet by mouth Daily.   Yes Historical Provider, MD       Allergies:  Simvastatin; Lexapro [escitalopram]; Acyclovir and related; Altace [ramipril]; Aspirin; Benicar [olmesartan]; Bystolic [nebivolol hcl]; Cephalexin; Codeine; Dynacirc [isradipine]; Factive [gemifloxacin]; Levaquin [levofloxacin in d5w]; Lisinopril; Norvasc [amlodipine]; Penicillins; Rocephin [ceftriaxone]; Septra [sulfamethoxazole-trimethoprim]; Talwin [pentazocine]; Tekturna [aliskiren]; Trimox [amoxicillin]; Verelan [verapamil hcl er]; Zithromax [azithromycin]; and Zomig [zolmitriptan]      Review of system  Review of Systems   Cardiovascular: Positive for chest pain.   Neurological: Positive for weakness.       Vitals:    07/16/17 1550   BP: 159/84   Pulse: 57   Resp: 16   Temp: 98.6 °F (37 °C)   SpO2: 94%       Physical exam  Physical Exam   Constitutional: She appears well-developed and well-nourished.   HENT:   Head: Normocephalic and atraumatic.   Eyes: EOM are normal. Pupils are equal, round, and reactive to light.   Cardiovascular: Normal rate and regular rhythm.    Neurological: She is alert. She has normal reflexes. She displays normal reflexes. No cranial nerve deficit. She exhibits normal muscle tone. Coordination normal.         Lab Results   Component Value Date    WBC 4.25 07/16/2017    HGB 9.2 (L)  07/16/2017    HCT 28.0 (L) 07/16/2017    MCV 91.2 07/16/2017     07/16/2017     Lab Results   Component Value Date    GLUCOSE 105 (H) 07/15/2017    BUN 24 (H) 07/15/2017    CREATININE 1.10 07/15/2017    EGFRIFNONA 49 (L) 07/15/2017    BCR 21.8 07/15/2017    CO2 23.0 07/15/2017    CALCIUM 10.0 07/15/2017    ALBUMIN 3.90 07/15/2017    LABIL2 0.8 (L) 07/15/2017    AST 29 07/15/2017    ALT 16 07/15/2017         Radiological Studies:  EXAMINATION: MRI BRAIN WO CONTRAST - 07/16/2017      INDICATION: TIA; G45.8-Other transient cerebral ischemic attacks and  related syndromes; I16.9-Hypertensive crisis, unspecified; R07.9-Chest  pain, unspecified; Z86.79-Personal history of other diseases of the  circulatory system.      TECHNIQUE: Sagittal, axial and coronal MR imaging of the brain was  performed at 5 mm intervals in the axial plane without intravenous  contrast.       COMPARISON: None.      FINDINGS: There is a tiny old left cerebellar infarct. There are  periventricular white matter changes typical of aging. There is no  intracranial mass. There is no hemorrhage. There is no extra-axial fluid  collection. The pituitary gland is normal. There is no acute infarct.      IMPRESSION:  There are no acute findings. There is a small old left  cerebellar infarct.    During this visit the following were done:  Labs Reviewed [x]    Labs Ordered []    Radiology Reports Reviewed [x]    Radiology Ordered []    EKG, echo, and/or stress test reviewed []    EEG results reviewed  []    EEG reviewed and interpreted per myself   []    Discussed case with neurointerventionalist or neuroradiologist []    Referring Provider Records Reviewed []    ER Records Reviewed []    Hospital Records Reviewed []    History Obtained From Family []    Radiological images view and Interpreted per myself [x]    Case Discussed with referring provider []     Decision to obtain and request outside records  []        Assessment and Plan     1. Other  specified transient cerebral ischemias    2. Hypertensive crisis    3. Chest pain, unspecified type    4. History of coronary artery disease    5. Impaired functional mobility, balance, gait, and endurance      Patient is neurologically back to baseline, no evidence of acute changes on MRI. Labs consistent with Plavix resistance. Plans for cardiac work up noted. Checked with Dr. Casas for alternative to Plavix, will start Effient.        Electronically signed by Rene Quintanilla MD on 7/16/2017 at 5:12 PM

## 2017-07-16 NOTE — PLAN OF CARE
Problem: Patient Care Overview (Adult)  Goal: Plan of Care Review  Outcome: Outcome(s) achieved Date Met:  07/16/17 07/16/17 1159   Coping/Psychosocial Response Interventions   Plan Of Care Reviewed With patient   Patient Care Overview   Progress improving   Outcome Evaluation   Outcome Summary/Follow up Plan Patient with resolving symptome. No further skilled PT needed. May walk on floor with nursing.         Problem: Inpatient Physical Therapy  Goal: Gait Training Goal STG- PT  Outcome: Outcome(s) achieved Date Met:  07/16/17 07/16/17 1159   Gait Training PT STG   Gait Training Goal PT STG, Date Established 07/16/17   Gait Training Goal PT STG, Time to Achieve by discharge   Gait Training Goal PT STG, [place] Level supervision required   Gait Training Goal PT STG, Distance to Achieve 250   Gait Training Goal PT STG, Outcome goal met

## 2017-07-16 NOTE — SIGNIFICANT NOTE
Called by nurse for pt having increased headache after returning from MRI. Noted that she was now with fever. Tylenol ordered. During lab review, also noted increased troponin. Trending troponin was pending. Pt is currently CP free.   Upon completion of trended troponin, now at critical level. Heparin gtt initiated, pt previously received ASA and currently on plavix. Cardiology consult for am placed.
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
## 2017-07-16 NOTE — PLAN OF CARE
Problem: Stroke (Ischemic) (Adult)  Intervention: Manage Hypertension/Promote Hemodynamic Stability    07/16/17 0122   Safety Interventions   Medication Review/Management medications reviewed   Safety Interventions   Bleeding Precautions blood pressure closely monitored;coagulation studies reviewed;monitored for signs of bleeding

## 2017-07-16 NOTE — THERAPY EVALUATION
Acute Care - Speech Language Pathology Initial Evaluation  [facility]   Cognitive-Communication Evaluation     Patient Name: Janice Romero  : 1945  MRN: [id number]  Today's Date: 2017  Onset of Illness/Injury or Date of Surgery Date: 07/15/17  Date of Referral to SLP: 17         Admit Date: 7/15/2017     Visit Dx:    ICD-10-CM ICD-9-CM   1. Other specified transient cerebral ischemias G45.8 435.8   2. Hypertensive crisis I16.9 401.9   3. Chest pain, unspecified type R07.9 786.50   4. History of coronary artery disease Z86.79 V12.59   5. Impaired functional mobility, balance, gait, and endurance Z74.09 V49.89     Patient Active Problem List   Diagnosis   • Transient cerebral ischemia   • CAD (coronary artery disease)   • HTN (hypertension)     Past Medical History:   Diagnosis Date   • CKD (chronic kidney disease)    • Hypertension    • MI (myocardial infarction)    • Stroke      Past Surgical History:   Procedure Laterality Date   • CHOLECYSTECTOMY     • CORONARY STENT PLACEMENT     • HYSTERECTOMY     • TUBAL ABDOMINAL LIGATION            SLP EVALUATION (last 72 hours)      SLP Evaluation       17 1300                Rehab Evaluation    Document Type evaluation  -AV        Subjective Information no complaints  -AV        General Information    Patient Profile Review yes  -AV        Onset of Illness/Injury 17  -AV        Subjective Patient Observations alert, cooperative  -AV        Pertinent History Of Current Problem CKD, HTN, CVA, MI  -AV        Current Diet Limitations regular solid;thin liquids  -AV        Precautions/Limitations, Vision other (see comments)   functional for eval purposes  -AV        Precautions/Limitations, Hearing other (see comments)   functional for eval purposes  -AV        Prior Level of Function- Communication functional in all spheres  -AV        Prior Level of Function- Swallowing no diet consistency restrictions  -AV        Plans/Goals Discussed With  patient  -AV        Barriers to Rehab none identified  -AV        Living Environment    Lives With alone  -AV        Living Arrangements house  -AV        Clinical Impression    Date of Referral to SLP 07/16/17  -AV        SLP Diagnosis WFL  -AV        Functional Level At Time Of Evaluation WFL  -AV        Patient's Goals For Discharge return to all previous roles/activities  -AV        Criteria for Skilled Therapeutic Interventions Met current level of function same as previous level of function  -AV        Anticipated Discharge Disposition home  -AV        Pain Assessment    Pain Assessment No/denies pain  -AV        Cognitive Assessment/Intervention    Current Cognitive/Communication Assessment functional  -AV        Orientation Status oriented x 4  -AV        Follows Commands/Answers Questions 100% of the time;able to follow multi-step instructions  -AV        Short/Long Term Memory intact short term memory;intact long term memory  -AV        Auditory Comprehen Assess/Intervention    Auditory Comprehension WNL/WFL  -AV        Auditory Comprehen Assess/Intervention    Answers Yes/No Questions WNL/WFL  -AV        Able to Follow Commands WNL/WFL  -AV        Verbal Expression Assess/Intervention    Speech Fluency fluent speech  -AV        Conversational Speech WNL/WFL  -AV        Reading Assessment/Intervention    Reading Skills WNL/WFL  -AV        Motor Speech Assess/Intervention    Motor Speech- Apraxia WNL/WFL  -AV        Motor Speech- Dysarthria WNL/WFL  -AV          User Key  (r) = Recorded By, (t) = Taken By, (c) = Cosigned By    Initials Name Effective Dates    AV Della Mares MS CCC-SLP 03/14/16 -            EDUCATION  The patient has been educated in the following areas:   Cognitive Impairment Communication Impairment.    SLP Recommendation and Plan  SLP Diagnosis: [facility]        Criteria for Skilled Therapeutic Interventions Met: current level of function same as previous level of function  Anticipated  Discharge Disposition: home                Plan of Care Review  Plan Of Care Reviewed With: patient  Outcome Summary/Follow up Plan: Cog/comm eval this pm:  No difficulties with speech and language at this time.  Patient at baseline.  No further speech recs at this time.              Time Calculation:         Time Calculation- SLP       07/16/17 1345          Time Calculation- SLP    SLP Start Time 1330  -AV      SLP Received On 07/16/17  -AV        User Key  (r) = Recorded By, (t) = Taken By, (c) = Cosigned By    Initials Name Provider Type    AV Della Mares MS CCC-SLP Speech and Language Pathologist          Therapy Charges for Today     Code Description Service Date Service Provider Modifiers Qty    93481596684 HC ST EVAL SPEECH AND PROD W LANG  3 7/16/2017 Della Mares MS CCC-SLP GN 1                     Della Mares MS CCC-FER  7/16/2017
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
## 2017-07-16 NOTE — PLAN OF CARE
Problem: Stroke (Ischemic) (Adult)  Goal: Signs and Symptoms of Listed Potential Problems Will be Absent or Manageable (Stroke)  Outcome: Ongoing (interventions implemented as appropriate)    Problem: Patient Care Overview (Adult)  Goal: Plan of Care Review  Outcome: Ongoing (interventions implemented as appropriate)    07/16/17 1343   Coping/Psychosocial Response Interventions   Plan Of Care Reviewed With patient   Outcome Evaluation   Outcome Summary/Follow up Plan Cog/comm eval this pm: No difficulties with speech and language at this time. Patient at baseline. No further speech recs at this time.

## 2017-07-16 NOTE — PLAN OF CARE
Problem: Fall Risk (Adult)  Goal: Identify Related Risk Factors and Signs and Symptoms  Outcome: Ongoing (interventions implemented as appropriate)  Goal: Absence of Falls  Outcome: Ongoing (interventions implemented as appropriate)    Problem: Stroke (Ischemic) (Adult)  Goal: Signs and Symptoms of Listed Potential Problems Will be Absent or Manageable (Stroke)  Outcome: Ongoing (interventions implemented as appropriate)    Problem: Patient Care Overview (Adult)  Goal: Plan of Care Review  Outcome: Ongoing (interventions implemented as appropriate)    07/16/17 1501   Coping/Psychosocial Response Interventions   Plan Of Care Reviewed With patient   Patient Care Overview   Progress progress toward functional goals as expected   Outcome Evaluation   Outcome Summary/Follow up Plan Pt has been alert and oriented. NIH 0, Stress test in morning. No complaints of head or chest pain.

## 2017-07-17 ENCOUNTER — APPOINTMENT (OUTPATIENT)
Dept: CARDIOLOGY | Facility: HOSPITAL | Age: 72
End: 2017-07-17
Attending: INTERNAL MEDICINE

## 2017-07-17 LAB
ANION GAP SERPL CALCULATED.3IONS-SCNC: 8 MMOL/L (ref 3–11)
APTT PPP: 50.5 SECONDS (ref 45–60)
BH CV STRESS BP STAGE 1: NORMAL
BH CV STRESS BP STAGE 3: NORMAL
BH CV STRESS BP STAGE 4: NORMAL
BH CV STRESS COMMENTS STAGE 1: NORMAL
BH CV STRESS DOSE REGADENOSON STAGE 1: 0.4
BH CV STRESS DURATION MIN STAGE 1: 1
BH CV STRESS DURATION MIN STAGE 2: 1
BH CV STRESS DURATION MIN STAGE 3: 1
BH CV STRESS DURATION MIN STAGE 4: 1
BH CV STRESS DURATION SEC STAGE 1: 0
BH CV STRESS DURATION SEC STAGE 2: 0
BH CV STRESS DURATION SEC STAGE 3: 0
BH CV STRESS DURATION SEC STAGE 4: 0
BH CV STRESS HR STAGE 1: 85
BH CV STRESS HR STAGE 2: 92
BH CV STRESS HR STAGE 3: 88
BH CV STRESS HR STAGE 4: 83
BH CV STRESS PROTOCOL 1: NORMAL
BH CV STRESS RECOVERY BP: NORMAL MMHG
BH CV STRESS RECOVERY HR: 76 BPM
BH CV STRESS RECOVERY O2: 98 %
BH CV STRESS STAGE 1: 1
BH CV STRESS STAGE 2: 2
BH CV STRESS STAGE 3: 3
BH CV STRESS STAGE 4: 4
BUN BLD-MCNC: 18 MG/DL (ref 9–23)
BUN/CREAT SERPL: 16.4 (ref 7–25)
CALCIUM SPEC-SCNC: 9.6 MG/DL (ref 8.7–10.4)
CHLORIDE SERPL-SCNC: 103 MMOL/L (ref 99–109)
CO2 SERPL-SCNC: 25 MMOL/L (ref 20–31)
CREAT BLD-MCNC: 1.1 MG/DL (ref 0.6–1.3)
DEPRECATED RDW RBC AUTO: 41.9 FL (ref 37–54)
ERYTHROCYTE [DISTWIDTH] IN BLOOD BY AUTOMATED COUNT: 12.5 % (ref 11.3–14.5)
GFR SERPL CREATININE-BSD FRML MDRD: 49 ML/MIN/1.73
GLUCOSE BLD-MCNC: 95 MG/DL (ref 70–100)
HCT VFR BLD AUTO: 30 % (ref 34.5–44)
HGB BLD-MCNC: 10 G/DL (ref 11.5–15.5)
LV EF NUC BP: 50 %
MAXIMAL PREDICTED HEART RATE: 151 BPM
MCH RBC QN AUTO: 30.3 PG (ref 27–31)
MCHC RBC AUTO-ENTMCNC: 33.3 G/DL (ref 32–36)
MCV RBC AUTO: 90.9 FL (ref 80–99)
PERCENT MAX PREDICTED HR: 61.59 %
PLATELET # BLD AUTO: 208 10*3/MM3 (ref 150–450)
PMV BLD AUTO: 10.2 FL (ref 6–12)
POTASSIUM BLD-SCNC: 3.7 MMOL/L (ref 3.5–5.5)
RBC # BLD AUTO: 3.3 10*6/MM3 (ref 3.89–5.14)
SODIUM BLD-SCNC: 136 MMOL/L (ref 132–146)
STRESS BASELINE BP: NORMAL MMHG
STRESS BASELINE HR: 58 BPM
STRESS O2 SAT REST: 99 %
STRESS PERCENT HR: 72 %
STRESS POST O2 SAT PEAK: 98 %
STRESS POST PEAK BP: NORMAL MMHG
STRESS POST PEAK HR: 93 BPM
STRESS TARGET HR: 128 BPM
TROPONIN I SERPL-MCNC: 1.72 NG/ML
WBC NRBC COR # BLD: 2.91 10*3/MM3 (ref 3.5–10.8)

## 2017-07-17 PROCEDURE — 80048 BASIC METABOLIC PNL TOTAL CA: CPT | Performed by: INTERNAL MEDICINE

## 2017-07-17 PROCEDURE — 78492 MYOCRD IMG PET MLT RST&STRS: CPT

## 2017-07-17 PROCEDURE — 25010000002 REGADENOSON 0.4 MG/5ML SOLUTION: Performed by: INTERNAL MEDICINE

## 2017-07-17 PROCEDURE — A9555 RB82 RUBIDIUM: HCPCS | Performed by: INTERNAL MEDICINE

## 2017-07-17 PROCEDURE — 99232 SBSQ HOSP IP/OBS MODERATE 35: CPT | Performed by: INTERNAL MEDICINE

## 2017-07-17 PROCEDURE — 85730 THROMBOPLASTIN TIME PARTIAL: CPT | Performed by: INTERNAL MEDICINE

## 2017-07-17 PROCEDURE — 85027 COMPLETE CBC AUTOMATED: CPT | Performed by: INTERNAL MEDICINE

## 2017-07-17 PROCEDURE — 93018 CV STRESS TEST I&R ONLY: CPT | Performed by: INTERNAL MEDICINE

## 2017-07-17 PROCEDURE — 63710000001 PROMETHAZINE PER 12.5 MG: Performed by: PHYSICIAN ASSISTANT

## 2017-07-17 PROCEDURE — 0 RUBIDIUM CHLORIDE: Performed by: INTERNAL MEDICINE

## 2017-07-17 PROCEDURE — 78492 MYOCRD IMG PET MLT RST&STRS: CPT | Performed by: INTERNAL MEDICINE

## 2017-07-17 PROCEDURE — 93017 CV STRESS TEST TRACING ONLY: CPT

## 2017-07-17 PROCEDURE — 84484 ASSAY OF TROPONIN QUANT: CPT | Performed by: INTERNAL MEDICINE

## 2017-07-17 PROCEDURE — 93005 ELECTROCARDIOGRAM TRACING: CPT | Performed by: INTERNAL MEDICINE

## 2017-07-17 PROCEDURE — 99233 SBSQ HOSP IP/OBS HIGH 50: CPT | Performed by: INTERNAL MEDICINE

## 2017-07-17 RX ORDER — PANTOPRAZOLE SODIUM 40 MG/1
40 TABLET, DELAYED RELEASE ORAL DAILY
COMMUNITY
End: 2018-07-19

## 2017-07-17 RX ORDER — PROMETHAZINE HYDROCHLORIDE 12.5 MG/1
12.5 TABLET ORAL EVERY 6 HOURS PRN
Status: DISCONTINUED | OUTPATIENT
Start: 2017-07-17 | End: 2017-07-18 | Stop reason: HOSPADM

## 2017-07-17 RX ADMIN — REGADENOSON 0.4 MG: 0.08 INJECTION, SOLUTION INTRAVENOUS at 09:14

## 2017-07-17 RX ADMIN — ATORVASTATIN CALCIUM 80 MG: 40 TABLET, FILM COATED ORAL at 22:41

## 2017-07-17 RX ADMIN — PROMETHAZINE HYDROCHLORIDE 12.5 MG: 12.5 TABLET ORAL at 10:03

## 2017-07-17 RX ADMIN — RUBIDIUM CHLORIDE RB-82 1 DOSE: 150 INJECTION, SOLUTION INTRAVENOUS at 09:10

## 2017-07-17 RX ADMIN — PRASUGREL HYDROCHLORIDE 10 MG: 10 TABLET, FILM COATED ORAL at 08:10

## 2017-07-17 RX ADMIN — RUBIDIUM CHLORIDE RB-82 1 DOSE: 150 INJECTION, SOLUTION INTRAVENOUS at 09:00

## 2017-07-17 NOTE — PROGRESS NOTES
"      HOSPITALIST DAILY PROGRESS NOTE    Chief Complaint: Weakness    Subjective   SUBJECTIVE/OVERNIGHT EVENTS   No acute events overnight.Patient had a good night, denies any CP or palpitations, no weakness, states that most of her symptoms have resolved.    Review of Systems:  Gen-no fevers, no chills  CV-no chest pain, no palpitations  Resp-no cough, no dyspnea  GI-no N/V/D, no abd pain    Objective   OBJECTIVE   I have reviewed the vital signs.  /76  Pulse 59  Temp 97.8 °F (36.6 °C) (Temporal Artery )   Resp 16  Ht 61\" (154.9 cm)  Wt 120 lb (54.4 kg)  SpO2 97%  BMI 22.67 kg/m2    Physical Exam:  Gen-no acute distress  CV-RRR, S1 S2 normal, no m/r/g  Resp-CTAB, no wheezes  Abd-soft, NT, ND, +BS  Ext-no edema  Neuro-A&Ox3, no focal deficits  Psych-appropriate mood and affect    Results:  I have reviewed the labs, radiology results, and diagnostic studies.      Results from last 7 days  Lab Units 07/17/17  0730 07/16/17  0032 07/15/17  1040   WBC 10*3/mm3 2.91* 4.25 5.47   HEMOGLOBIN g/dL 10.0* 9.2* 11.1*   HEMATOCRIT % 30.0* 28.0* 33.2*   PLATELETS 10*3/mm3 208 194 242       Results from last 7 days  Lab Units 07/17/17  0730   SODIUM mmol/L 136   POTASSIUM mmol/L 3.7   CHLORIDE mmol/L 103   CO2 mmol/L 25.0   BUN mg/dL 18   CREATININE mg/dL 1.10   GLUCOSE mg/dL 95   CALCIUM mg/dL 9.6       Radiology Results:  Imaging Results (last 24 hours)     Procedure Component Value Units Date/Time    MRI Brain Without Contrast [[id number]] Collected:  07/16/17 1137     Updated:  07/16/17 1158    Narrative:       EXAMINATION: MRI BRAIN WO CONTRAST - 07/16/2017     INDICATION: TIA; G45.8-Other transient cerebral ischemic attacks and  related syndromes; I16.9-Hypertensive crisis, unspecified; R07.9-Chest  pain, unspecified; Z86.79-Personal history of other diseases of the  circulatory system.     TECHNIQUE: Sagittal, axial and coronal MR imaging of the brain was  performed at 5 mm intervals in the axial plane without " intravenous  contrast.       COMPARISON: None.     FINDINGS:  There is a tiny old left cerebellar infarct. There are  periventricular white matter changes typical of aging. There is no  intracranial mass. There is no hemorrhage. There is no extra-axial fluid  collection. The pituitary gland is normal. There is no acute infarct.       Impression:       There are no acute findings. There is a small old left  cerebellar infarct.     DICTATED:     07/16/2017  EDITED:         07/16/2017         This report was finalized on 7/16/2017 11:56 AM by Dr. Beka Lui MD.           I have reviewed the medications.    Assessment/Plan   ASSESSMENT/PLAN    Active Problems:    Transient cerebral ischemia    CAD (coronary artery disease)    HTN (hypertension)    71 yof admitted 7/16 with right sided weakness and numbness, chest pain and headache with BP in 200s hypertensive concerns for CVA      Plan  - Patient back to neurological baseline  - Encephalopathy likely hypertensive encephalopathy, CVA suspected, neurology following. S/p MRI Continue plavix and ASA  - Chest pain with suspected ACS has hx of CAD with stents now with elevated troponin, EKG with LBBB that was unchanged from prior, started on heparin gtt,   -  Cardiology following, will continue ASA, P2Y12 323, Plavix switched to Effient, plan for ischemic work-up, Lu PET scan today.  - Patient with interim development of fever, no leukocytosis no abx started  - DVT ppx- on Heparin gtt  - PT/OT     Dispo: anticipate d/c in 1-2 days     Anshul Valdes MD  07/17/17  9:40 AM

## 2017-07-17 NOTE — NURSING NOTE
"1000- Pt returned from Cardiac Lab after PET, stress test with myocardial perfusion. Pt c/o nausea. PO Phenegran was administered per orders.  1120- Upon reassessing patients nausea, she said it had gone away. But asked \"Is it normal for me to have chest pain after my test? My left arm started to hurt too. Its gone away now but i just wanted to ask.\" I questioned pt further and she said the pain wasn't active and symptoms had resolved but she did get SOA when walking to the bathroom. Pt did not c/o indigestion or pain radiating to shoulder blades; pts skin was cool and dry. I called the lab that performed the test and spoke with the tech who verified with the nurse on duty that these are normal symptoms and that caffiene is the antidote. Pt is drinking Coke.   1130- After speaking with the charge nurse, Melquiades, we completed an ECG and i paged Dr. Casas the cardiologist on this case. No new orders have been placed. Pt is resting comfortably in bed with family at bedside.   "

## 2017-07-17 NOTE — PLAN OF CARE
Problem: Patient Care Overview (Adult)  Goal: Plan of Care Review  Outcome: Ongoing (interventions implemented as appropriate)    07/17/17 0150   Coping/Psychosocial Response Interventions   Plan Of Care Reviewed With patient   Patient Care Overview   Progress progress toward functional goals as expected       Goal: Discharge Needs Assessment  Outcome: Ongoing (interventions implemented as appropriate)

## 2017-07-17 NOTE — PLAN OF CARE
Problem: Fall Risk (Adult)  Goal: Identify Related Risk Factors and Signs and Symptoms  Outcome: Ongoing (interventions implemented as appropriate)    07/17/17 1830   Fall Risk   Fall Risk: Related Risk Factors gait/mobility problems   Fall Risk: Signs and Symptoms presence of risk factors       Goal: Absence of Falls  Outcome: Ongoing (interventions implemented as appropriate)    07/17/17 1830   Fall Risk (Adult)   Absence of Falls making progress toward outcome         Problem: Stroke (Ischemic) (Adult)  Goal: Signs and Symptoms of Listed Potential Problems Will be Absent or Manageable (Stroke)  Outcome: Ongoing (interventions implemented as appropriate)    07/17/17 1830   Stroke (Ischemic)   Problems Assessed (Stroke (Ischemic)/TIA) all   Problems Present (Stroke (Ischemic)/TIA) muscle tone abnormal

## 2017-07-17 NOTE — PROGRESS NOTES
Discharge Planning Assessment  [facility]     Patient Name: Janice Romero  MRN: [id number]  Today's Date: 7/17/2017    Admit Date: 7/15/2017          Discharge Needs Assessment       07/17/17 0942    Living Environment    Lives With alone    Living Arrangements house    Home Accessibility no concerns    Stair Railings at Home none    Type of Financial/Environmental Concern none    Transportation Available car;family or friend will provide    Living Environment    Provides Primary Care For no one    Quality Of Family Relationships helpful;involved;supportive    Able to Return to Prior Living Arrangements yes    Discharge Needs Assessment    Concerns To Be Addressed discharge planning concerns    Readmission Within The Last 30 Days no previous admission in last 30 days    Anticipated Changes Related to Illness none    Equipment Currently Used at Home cane, quad    Equipment Needed After Discharge none    Discharge Contact Information if Applicable Abdiel Romero [phone]    Discharge Planning Comments Home            Discharge Plan       07/17/17 0943    Case Management/Social Work Plan    Plan Home    Patient/Family In Agreement With Plan yes    Additional Comments Pt lives alone in [facility]. She reports she has a cane which she uses occassionally for mobility. She states she was independent with all ADLs and was able to drive prior to admit. Pt is followed by her PCP and her medications are covered by her insurance. Her adult son reports he can assist as needed. Goal for discharge is to return home. CM to follow.        Discharge Placement     No information found                Demographic Summary       07/17/17 0941    Referral Information    Admission Type inpatient    Referral Source physician    Reason For Consult discharge planning    Contact Information    Permission Granted to Share Information With ;family/designee    Primary Care Physician Information    Name Fred Winter             Functional Status       07/17/17 0942    Functional Status Current    Current Functional Level Comment See PT eval    Functional Status Prior    Ambulation 0-->independent    Transferring 0-->independent    Toileting 0-->independent    Bathing 0-->independent    Dressing 0-->independent    Eating 0-->independent    Communication 0-->understands/communicates without difficulty    Swallowing 0-->swallows foods/liquids without difficulty    IADL    Medications independent    Meal Preparation independent    Housekeeping independent    Laundry independent    Shopping independent    Oral Care independent            Psychosocial     None            Abuse/Neglect     None            Legal     None            Substance Abuse     None            Patient Forms     None          Rashmi Mike RN

## 2017-07-17 NOTE — PROGRESS NOTES
"[facility] Progress Note     LOS: 2 days   Patient Care Team:  Fred Winter MD as PCP - General (Adolescent Medicine)  PCP:  Fred Winter MD    Chief Complaint:  Fu cp    SUBJECTIVE: Some chest discomfort/nausea post lexiscan      Review of Systems:   All systems have been reviewed and are negative with the exception of those mentioned above.      OBJECTIVE:    Vital Sign Min/Max for last 24 hours  Temp  Min: 97.7 °F (36.5 °C)  Max: 98.5 °F (36.9 °C)   BP  Min: 123/76  Max: 183/89   Pulse  Min: 57  Max: 59   Resp  Min: 16  Max: 16   SpO2  Min: 96 %  Max: 97 %   No Data Recorded   No Data Recorded     Flowsheet Rows         First Filed Value    Admission Height  61\" (154.9 cm) Documented at 07/15/2017 1009    Admission Weight  120 lb (54.4 kg) Documented at 07/15/2017 1009          Telemetry: sr    No intake or output data in the 24 hours ending 07/17/17 1755  Intake & Output (last 3 days)       07/14 0701 - 07/15 0700 07/15 0701 - 07/16 0700 07/16 0701 - 07/17 0700 07/17 0701 - 07/18 0700    P.O.  480      Total Intake(mL/kg)  480 (8.8)      Urine (mL/kg/hr)  650 200 (0.2)     Total Output   650 200      Net   -170 -200              Unmeasured Urine Occurrence   2 x 1 x    Unmeasured Stool Occurrence    1 x           Physical Exam:  Physical Exam   Constitutional: She appears well-developed and well-nourished.   Neck: Normal range of motion. Neck supple. No hepatojugular reflux and no JVD present. Carotid bruit is not present. No tracheal deviation present. No thyromegaly present.   Cardiovascular: Normal rate, regular rhythm, S1 normal, S2 normal, intact distal pulses and normal pulses.  PMI is not displaced.  Exam reveals no gallop, no distant heart sounds, no friction rub, no midsystolic click and no opening snap.    No murmur heard.  Pulses:       Radial pulses are 2+ on the right side, and 2+ on the left side.        Dorsalis pedis pulses are 2+ on the right " side, and 2+ on the left side.        Posterior tibial pulses are 2+ on the right side, and 2+ on the left side.   Pulmonary/Chest: Effort normal and breath sounds normal. She has no wheezes. She has no rales.   Abdominal: Soft. Bowel sounds are normal. She exhibits no mass. There is no tenderness. There is no guarding.        LABS/DIAGNOSTIC DATA:    Results from last 7 days  Lab Units 07/17/17  0730 07/16/17  0032 07/15/17  1040   WBC 10*3/mm3 2.91* 4.25 5.47   HEMOGLOBIN g/dL 10.0* 9.2* 11.1*   HEMATOCRIT % 30.0* 28.0* 33.2*   PLATELETS 10*3/mm3 208 194 242     No results found for: TROPONINT    Results from last 7 days  Lab Units 07/17/17  0730 07/16/17  1756 07/16/17  1129  07/16/17  0032   INR   --   --   --   --  1.04   APTT seconds 50.5 49.5 49.0  < > 35.0*   < > = values in this interval not displayed.    Results from last 7 days  Lab Units 07/17/17  0730 07/15/17  1040   SODIUM mmol/L 136 133   POTASSIUM mmol/L 3.7 4.3   CHLORIDE mmol/L 103 101   CO2 mmol/L 25.0 23.0   BUN mg/dL 18 24*   CREATININE mg/dL 1.10 1.10   CALCIUM mg/dL 9.6 10.0   BILIRUBIN mg/dL  --  0.2*   ALK PHOS U/L  --  115*   ALT (SGPT) U/L  --  16   AST (SGOT) U/L  --  29   GLUCOSE mg/dL 95 105*       Results from last 7 days  Lab Units 07/16/17  0541   HEMOGLOBIN A1C % 5.70*       Results from last 7 days  Lab Units 07/16/17  0541   CHOLESTEROL mg/dL 150   TRIGLYCERIDES mg/dL 109   HDL CHOL mg/dL 30*       Results from last 7 days  Lab Units 07/15/17  1554   TSH mIU/mL 1.471       Results from last 7 days  Lab Units 07/15/17  1040   BNP pg/mL 125.0*       Medication Review:     atorvastatin 80 mg Oral Nightly   pantoprazole 40 mg Oral QAM   Pharmacy Meds to Bed Consult  Does not apply Daily   prasugrel 10 mg Oral Daily   sodium chloride 2,000 mL Intravenous Once        sodium chloride 100 mL/hr Last Rate: 100 mL/hr (07/15/17 9608)        Active Problems:    Transient cerebral ischemia    CAD (coronary artery disease)    HTN  (hypertension)      ASSESSMENT/PLAN:  Troponinosis- low risk lexiscan.  No perfusion abnormality.  Agree w transition of plavix to effient as had platelet resistance.    Home anytime  Fu w me in [place] in 4 months.              Diego Casas MD   07/17/17  5:55 PM

## 2017-07-18 VITALS
BODY MASS INDEX: 22.66 KG/M2 | TEMPERATURE: 98 F | RESPIRATION RATE: 16 BRPM | DIASTOLIC BLOOD PRESSURE: 74 MMHG | SYSTOLIC BLOOD PRESSURE: 166 MMHG | OXYGEN SATURATION: 99 % | WEIGHT: 120 LBS | HEIGHT: 61 IN | HEART RATE: 68 BPM

## 2017-07-18 PROCEDURE — 99239 HOSP IP/OBS DSCHRG MGMT >30: CPT | Performed by: PHYSICIAN ASSISTANT

## 2017-07-18 PROCEDURE — 99232 SBSQ HOSP IP/OBS MODERATE 35: CPT | Performed by: INTERNAL MEDICINE

## 2017-07-18 RX ORDER — PRASUGREL 10 MG/1
10 TABLET, FILM COATED ORAL DAILY
Qty: 30 TABLET | Refills: 0 | Status: SHIPPED | OUTPATIENT
Start: 2017-07-18 | End: 2017-08-14 | Stop reason: SDUPTHER

## 2017-07-18 RX ORDER — ATORVASTATIN CALCIUM 80 MG/1
80 TABLET, FILM COATED ORAL NIGHTLY
Qty: 30 TABLET | Refills: 0 | Status: SHIPPED | OUTPATIENT
Start: 2017-07-18 | End: 2017-08-14 | Stop reason: SDUPTHER

## 2017-07-18 RX ADMIN — PRASUGREL HYDROCHLORIDE 10 MG: 10 TABLET, FILM COATED ORAL at 08:00

## 2017-07-18 RX ADMIN — PANTOPRAZOLE SODIUM 40 MG: 40 TABLET, DELAYED RELEASE ORAL at 08:00

## 2017-07-18 NOTE — PLAN OF CARE
Problem: Fall Risk (Adult)  Goal: Identify Related Risk Factors and Signs and Symptoms  Outcome: Ongoing (interventions implemented as appropriate)    07/18/17 0423   Fall Risk   Fall Risk: Related Risk Factors gait/mobility problems;environment unfamiliar   Fall Risk: Signs and Symptoms presence of risk factors       Goal: Absence of Falls  Outcome: Ongoing (interventions implemented as appropriate)    07/18/17 0423   Fall Risk (Adult)   Absence of Falls making progress toward outcome         Problem: Stroke (Ischemic) (Adult)  Goal: Signs and Symptoms of Listed Potential Problems Will be Absent or Manageable (Stroke)  Outcome: Ongoing (interventions implemented as appropriate)    07/18/17 0423   Stroke (Ischemic)   Problems Assessed (Stroke (Ischemic)/TIA) all   Problems Present (Stroke (Ischemic)/TIA) muscle tone abnormal         Problem: Patient Care Overview (Adult)  Goal: Plan of Care Review  Outcome: Ongoing (interventions implemented as appropriate)    07/18/17 0423   Coping/Psychosocial Response Interventions   Plan Of Care Reviewed With patient   Patient Care Overview   Progress improving   Outcome Evaluation   Outcome Summary/Follow up Plan Patient alert and oriented NIH 0 patient denied any pain or numbness. Patient up with stand by assistance, Patient rested well during the night.

## 2017-07-18 NOTE — PROGRESS NOTES
"[facility] at [facility] Progress Note     LOS: 3 days   Patient Care Team:  Fred Winter MD as PCP - General (Adolescent Medicine)  PCP:  Fred Winter MD    Chief Complaint:  Fu cp    SUBJECTIVE: Some chest discomfort/nausea post lexiscan      Review of Systems:   All systems have been reviewed and are negative with the exception of those mentioned above.      OBJECTIVE:    Vital Sign Min/Max for last 24 hours  Temp  Min: 98 °F (36.7 °C)  Max: 98.6 °F (37 °C)   BP  Min: 159/69  Max: 170/89   Pulse  Min: 59  Max: 76   Resp  Min: 16  Max: 16   SpO2  Min: 96 %  Max: 99 %   No Data Recorded   No Data Recorded     Flowsheet Rows         First Filed Value    Admission Height  61\" (154.9 cm) Documented at 07/15/2017 1009    Admission Weight  120 lb (54.4 kg) Documented at 07/15/2017 1009          Telemetry: sr    No intake or output data in the 24 hours ending 07/18/17 1108  Intake & Output (last 3 days)       07/15 0701 - 07/16 0700 07/16 0701 - 07/17 0700 07/17 0701 - 07/18 0700 07/18 0701 - 07/19 0700    P.O. 480       Total Intake(mL/kg) 480 (8.8)       Urine (mL/kg/hr) 650 200 (0.2)      Total Output 650 200        Net -170 -200                Unmeasured Urine Occurrence  2 x 3 x 1 x    Unmeasured Stool Occurrence   1 x            Physical Exam:  Physical Exam   Constitutional: She appears well-developed and well-nourished.   Neck: Normal range of motion. Neck supple. No hepatojugular reflux and no JVD present. Carotid bruit is not present. No tracheal deviation present. No thyromegaly present.   Cardiovascular: Normal rate, regular rhythm, S1 normal, S2 normal, intact distal pulses and normal pulses.  PMI is not displaced.  Exam reveals no gallop, no distant heart sounds, no friction rub, no midsystolic click and no opening snap.    No murmur heard.  Pulses:       Radial pulses are 2+ on the right side, and 2+ on the left side.        Dorsalis pedis pulses are 2+ on the right " side, and 2+ on the left side.        Posterior tibial pulses are 2+ on the right side, and 2+ on the left side.   Pulmonary/Chest: Effort normal and breath sounds normal. She has no wheezes. She has no rales.   Abdominal: Soft. Bowel sounds are normal. She exhibits no mass. There is no tenderness. There is no guarding.        LABS/DIAGNOSTIC DATA:    Results from last 7 days  Lab Units 07/17/17  0730 07/16/17  0032 07/15/17  1040   WBC 10*3/mm3 2.91* 4.25 5.47   HEMOGLOBIN g/dL 10.0* 9.2* 11.1*   HEMATOCRIT % 30.0* 28.0* 33.2*   PLATELETS 10*3/mm3 208 194 242     No results found for: TROPONINT    Results from last 7 days  Lab Units 07/17/17  0730 07/16/17  1756 07/16/17  1129  07/16/17  0032   INR   --   --   --   --  1.04   APTT seconds 50.5 49.5 49.0  < > 35.0*   < > = values in this interval not displayed.    Results from last 7 days  Lab Units 07/17/17  0730 07/15/17  1040   SODIUM mmol/L 136 133   POTASSIUM mmol/L 3.7 4.3   CHLORIDE mmol/L 103 101   CO2 mmol/L 25.0 23.0   BUN mg/dL 18 24*   CREATININE mg/dL 1.10 1.10   CALCIUM mg/dL 9.6 10.0   BILIRUBIN mg/dL  --  0.2*   ALK PHOS U/L  --  115*   ALT (SGPT) U/L  --  16   AST (SGOT) U/L  --  29   GLUCOSE mg/dL 95 105*       Results from last 7 days  Lab Units 07/16/17  0541   HEMOGLOBIN A1C % 5.70*       Results from last 7 days  Lab Units 07/16/17  0541   CHOLESTEROL mg/dL 150   TRIGLYCERIDES mg/dL 109   HDL CHOL mg/dL 30*       Results from last 7 days  Lab Units 07/15/17  1554   TSH mIU/mL 1.471       Results from last 7 days  Lab Units 07/15/17  1040   BNP pg/mL 125.0*       Medication Review:     atorvastatin 80 mg Oral Nightly   pantoprazole 40 mg Oral QAM   Pharmacy Meds to Bed Consult  Does not apply Daily   prasugrel 10 mg Oral Daily   sodium chloride 2,000 mL Intravenous Once        sodium chloride 100 mL/hr Last Rate: 100 mL/hr (07/15/17 3971)        Active Problems:    Transient cerebral ischemia    CAD (coronary artery disease)    HTN  (hypertension)      ASSESSMENT/PLAN:  Troponinosis- low risk lexiscan.  No perfusion abnormality.  Agree w transition of plavix to effient as had platelet resistance.  Reviewed [facility] film from 3/17 [place] that is c/w nonobstructive cad.    Home today  Fu w me in [place] in 4 months.              Diego Casas MD   07/18/17  11:08 AM

## 2017-07-18 NOTE — DISCHARGE SUMMARY
[facility] Medicine Services  DISCHARGE SUMMARY       Date of Admission: 7/15/2017  Date of Discharge:  7/18/2017  Primary Care Physician: Fred Winter MD  Consulting Physician(s)     Provider Relationship    Diego Casas MD Consulting Physician        Discharge Diagnoses:  Active Hospital Problems (** Indicates Principal Problem)    Diagnosis Date Noted   • Transient cerebral ischemia [G45.9] 07/15/2017   • CAD (coronary artery disease) [I25.10] 07/15/2017   • HTN (hypertension) [I10] 07/15/2017      Resolved Hospital Problems    Diagnosis Date Noted Date Resolved   No resolved problems to display.     Presenting Problem/History of Present Illness  Other specified transient cerebral ischemias [G45.8]     History of Present Illness on Day of Discharge:   No complaints today.  She is anxious for discharge.  Denies chest pain, shortness of breath, focal weakness, N/V/D or constipation.  She would like a work release.    Hospital Course  Ms. Flores a 72yo female with a history of CKD, HTN, CAD with prior MI and CVA.  She was referred to [facility] ED on 7/15/17 after she was noted to have right sided weakness and numbness with associated headache and chest pain while at work. On initial exam, patient was unable to give detailed history secondary to expressive aphasia.  ED evaluation did find that her systolic blood pressure was greater than 200 and was responsive to IV hydralazine.  She did become mildly hypotensive and required IV fluid bolus.    CT of the head without contrast showed no acute findings.  CT angiogram of the head and neck was negative.  MRI of the brain was pursued and showed no evidence of an acute infarct.  Due to history of coronary artery disease, elevated troponins and chest pain, a pharmacologic stress test was pursued and revealed a defect in the mid inferior lateral wall and normal left ventricular ejection fraction of 50%.    Due to normal  radiologic studies and normal stress test, the patient is stable for discharge on 7/18/17.  Patient was found to be Plavix resistance with elevated P2Y12 of 323 and her antiplatelet therapy was transitioned to Effient.  Losartan/HCTZ held at discharge, this may be resumed PCP at follow-up if blood pressures remain elevated.    Consults     Date and Time Order Name Status Description    7/16/2017 0035 Inpatient Consult to Cardiology Completed         Pertinent Test Results:    Results from last 7 days  Lab Units 07/17/17  0730 07/16/17  0032 07/15/17  1040   WBC 10*3/mm3 2.91* 4.25 5.47   HEMOGLOBIN g/dL 10.0* 9.2* 11.1*   HEMATOCRIT % 30.0* 28.0* 33.2*   PLATELETS 10*3/mm3 208 194 242     Results from last 7 days  Lab Units 07/17/17  0730 07/15/17  1040   SODIUM mmol/L 136 133   POTASSIUM mmol/L 3.7 4.3   CHLORIDE mmol/L 103 101   CO2 mmol/L 25.0 23.0   BUN mg/dL 18 24*   CREATININE mg/dL 1.10 1.10   GLUCOSE mg/dL 95 105*   CALCIUM mg/dL 9.6 10.0     Imaging Results (all)     Procedure Component Value Units Date/Time    CT Head Without Contrast [[id number]] Collected:  07/15/17 1240     Updated:  07/15/17 1309    Narrative:       EXAMINATION: CT HEAD WO CONTRAST - 07/15/2017     INDICATION: Right upper extremity weakness.     TECHNIQUE: CT scan of the head was performed at 5 mm intervals. No  intravenous contrast was utilized.      The radiation dose reduction device was turned on for each scan per the  ALARA (As Low as Reasonably Achievable) protocol.     COMPARISON: MR of the brain dated 10/17/2015.     FINDINGS: There is no intra-axial mass. There is no hemorrhage. There is  no midline shift or extra-axial fluid collection.There is a tiny lacunar  infarct in the right thalamic region.       Impression:       There are no acute findings.     DICTATED:     07/15/2017  EDITED:         07/15/2017        This report was finalized on 7/15/2017 1:07 PM by Dr. Beka Lui MD.       CT Angiogram Head With & Without  Contrast [[id number]] Collected:  07/15/17 1605     Updated:  07/15/17 1702    Narrative:       EXAMINATION: CT ANGIOGRAM HEAD W WO CONTRAST - 07/15/2017     INDICATION:  G45.8-Other transient cerebral ischemic attacks and related  syndromes; I16.9-Hypertensive crisis, unspecified; R07.9-Chest pain,  unspecified; Z86.79-Personal history of other diseases of the  circulatory system.     TECHNIQUE: Intracranial CT angiogram was performed with images acquired  in the axial plane and displayed in the axial as well as the  reconstructed sagittal and coronal projections (3D).     The radiation dose reduction device was turned on for each scan per the  ALARA (As Low as Reasonably Achievable) protocol.     COMPARISON: None.     FINDINGS: The intracranial portion of the internal carotid arteries is  normal. Anterior cerebral arteries and anterior communicating artery are  normal. Both middle cerebral arteries are normal with no stenosis. The  posterior fossa circulation is also normal.       Impression:       Normal intracranial CTA.     DICTATED:     07/15/2017  EDITED:         07/15/2017     This report was finalized on 7/15/2017 5:00 PM by Dr. Beka Lui MD.       CT Cerebral Perfusion With & Without Contrast [[id number]] Collected:  07/15/17 1546     Updated:  07/15/17 1702    Narrative:       EXAMINATION: CT CEREBRAL PERFUSION W/O CONTRAST - 07/15/2017     INDICATION: G45.8-Other transient cerebral ischemic attacks and related  syndromes; I16.9-Hypertensive crisis, unspecified; R07.9-Chest pain,  unspecified; Z86.79-Personal history of other diseases of the  circulatory system.     TECHNIQUE: Cerebral profusion imaging was obtained of the head without  the administration of intravenous contrast according to the CT perfusion  protocol. Parametric maps were reconstructed to demonstrate cerebral  blood flow, cerebral blood volume, time to drain and mean transit time.     The radiation dose reduction device was turned on  for each scan per the  ALARA (As Low as Reasonably Achievable) protocol.     COMPARISON: None.     FINDINGS: There is no significant flow asymmetry on perfusion, mean  transit time and time to maximum flow, and there is no irregularity when  compared with blood volume.        Impression:       Negative perfusion imaging of reversible neural ischemia.     DICTATED:     07/15/2017  EDITED:         07/15/2017     This report was finalized on 7/15/2017 5:00 PM by Dr. Beka Lui MD.       CT Angiogram Neck With & Without Contrast [[id number]] Collected:  07/15/17 1602     Updated:  07/15/17 1702    Narrative:       EXAMINATION: CT ANGIOGRAM NECK W WO CONTRAST - 07/15/2017     INDICATION: Aphasia; G45.8-Other transient cerebral ischemic attacks and  related syndromes; I16.9-Hypertensive crisis, unspecified; R07.9-Chest  pain, unspecified; Z86.79-Personal history of other diseases of the  circulatory system.     TECHNIQUE: CT angiogram of the neck was performed prior to and following  intravenous contrast. Images are displayed in the axial, coronal and  three-dimensional projections.     The radiation dose reduction device was turned on for each scan per the  ALARA (As Low as Reasonably Achievable) protocol.     COMPARISON: None.     FINDINGS: Both common carotid arteries are normal. There is no  calcification of the carotid arteries and the bifurcations are widely  patent with no stenosis. Both vertebral arteries are patent as is the  basilar artery.       Impression:       Normal CT angiogram of the neck. There is no carotid  occlusive disease.     DICTATED:     07/15/2017  EDITED:         07/15/2017     This report was finalized on 7/15/2017 5:00 PM by Dr. Beka Lui MD.       MRI Brain Without Contrast [[id number]] Collected:  07/16/17 1137     Updated:  07/16/17 1158    Narrative:       EXAMINATION: MRI BRAIN WO CONTRAST - 07/16/2017     INDICATION: TIA; G45.8-Other transient cerebral ischemic attacks and  related  syndromes; I16.9-Hypertensive crisis, unspecified; R07.9-Chest  pain, unspecified; Z86.79-Personal history of other diseases of the  circulatory system.     TECHNIQUE: Sagittal, axial and coronal MR imaging of the brain was  performed at 5 mm intervals in the axial plane without intravenous  contrast.       COMPARISON: None.     FINDINGS:  There is a tiny old left cerebellar infarct. There are  periventricular white matter changes typical of aging. There is no  intracranial mass. There is no hemorrhage. There is no extra-axial fluid  collection. The pituitary gland is normal. There is no acute infarct.       Impression:       There are no acute findings. There is a small old left  cerebellar infarct.     DICTATED:     07/16/2017  EDITED:         07/16/2017      This report was finalized on 7/16/2017 11:56 AM by Dr. Beka Lui MD.           Condition on Discharge: Stable, improved    Physical Exam   Temp:  [98 °F (36.7 °C)-98.6 °F (37 °C)] 98 °F (36.7 °C)  Heart Rate:  [59-76] 68  Resp:  [16] 16  BP: (159-170)/(69-89) 166/74     Constitutional: no acute distress, awake, alert  Respiratory: Clear to auscultation bilaterally, nonlabored respirations   Cardiovascular: RRR, no murmurs, rubs, or gallops, palpable pedal pulses bilaterally  Gastrointestinal: Positive bowel sounds, soft, nontender, nondistended  Musculoskeletal: No bilateral ankle edema  Psychiatric: oriented x 3, appropriate affect, cooperative  Neurologic: Strength symmetric in all extremities     Discharge Disposition  Home or Self Care    Discharge Medications   Janice Romero   Home Medication Instructions VY:[id number]    Printed on:07/18/17 1017   Medication Information                      albuterol (PROVENTIL HFA;VENTOLIN HFA) 108 (90 BASE) MCG/ACT inhaler  Inhale 2 puffs 4 (Four) Times a Day As Needed for Wheezing.             aspirin 81 MG EC tablet  Take 81 mg by mouth Every 12 (Twelve) Hours.             atorvastatin (LIPITOR) 80 MG  tablet  Take 1 tablet by mouth Every Night.             carvedilol (COREG) 3.125 MG tablet  Take 3.125 mg by mouth Daily.             cholecalciferol (VITAMIN D3) 1000 UNITS tablet  Take 1,000 Units by mouth Daily.             Garlic 1000 MG capsule  Take 1,000 mg by mouth Daily.             meclizine 25 MG chewable tablet chewable tablet  Chew 25 mg 3 (Three) Times a Day As Needed.             Multiple Vitamins-Minerals (MULTIVITAMIN ADULT PO)  Take 1 tablet by mouth Daily.             pantoprazole (PROTONIX) 40 MG EC tablet  Take 40 mg by mouth Daily.             PHARMACY MEDS TO BED CONSULT  Daily (Monday-Friday).             prasugrel (EFFIENT) 10 MG tablet  Take 1 tablet by mouth Daily.               Diet Instructions     Diet: Regular, Cardiac; Thin Liquids, No Restrictions       Discharge Diet:   Regular  Cardiac      Fluid Consistency:  Thin Liquids, No Restrictions               Activity Instructions     Activity as Tolerated                   No future appointments.  Additional Instructions for the Follow-ups that You Need to Schedule     Discharge Follow-Up With Specified Provider    As directed    To:  EDER - please schedule patient for a follow-up appointment to be seen in the [place] clinic in 4 months.       Discharge Follow-up with PCP    As directed    Follow Up Details:  PCP follow up in one week.                Inez Shearer PA-C 07/18/17 10:17 AM    Time: Discharge 40 min    Please note that portions of this note may have been completed with a voice recognition program. Efforts were made to edit the dictations, but occasionally words are mistranscribed.

## 2017-08-01 ENCOUNTER — TELEPHONE (OUTPATIENT)
Dept: CARDIOLOGY | Facility: CLINIC | Age: 72
End: 2017-08-01

## 2017-08-01 NOTE — TELEPHONE ENCOUNTER
Patient called and state that she saw her pcp yesterday and she had developed some blood clots on her arms and they told her to apply warm compresses. Patient stated she is short of breathe should she go to ER does she have a clot. Informed patient it sounds as if the clot and bruising is under skin not in lung as she has described. Patient stated it may be her asthma. Advised patient to call and go back to PCP as they saw her yesterday for evaluation if symptoms worsen go to ER. Patient verbalized understanding.

## 2017-08-02 ENCOUNTER — TELEPHONE (OUTPATIENT)
Dept: CARDIOLOGY | Facility: CLINIC | Age: 72
End: 2017-08-02

## 2017-08-02 NOTE — TELEPHONE ENCOUNTER
Patient called back today after seeing PCP and stated that he felt she needs blood thinner changed. Spoke to dr Casas and he said she can discontinue asa 81mg but remain on effient. We will obtain labs from yesterday and to please call if bruising does not resolve. Patient verbalized understanding.

## 2017-08-14 RX ORDER — ATORVASTATIN CALCIUM 80 MG/1
80 TABLET, FILM COATED ORAL NIGHTLY
Qty: 90 TABLET | Refills: 0 | Status: SHIPPED | OUTPATIENT
Start: 2017-08-14 | End: 2018-03-27

## 2017-08-14 RX ORDER — PRASUGREL 10 MG/1
10 TABLET, FILM COATED ORAL DAILY
Qty: 90 TABLET | Refills: 0 | Status: SHIPPED | OUTPATIENT
Start: 2017-08-14 | End: 2017-11-15 | Stop reason: SDUPTHER

## 2017-11-15 RX ORDER — PRASUGREL HCL 10 MG
TABLET ORAL
Qty: 90 TABLET | Refills: 1 | Status: SHIPPED | OUTPATIENT
Start: 2017-11-15 | End: 2018-07-19

## 2017-12-29 ENCOUNTER — HOSPITAL ENCOUNTER (OUTPATIENT)
Age: 72
Discharge: HOME | End: 2017-12-29
Payer: COMMERCIAL

## 2017-12-29 DIAGNOSIS — N18.3: Primary | ICD-10-CM

## 2017-12-29 PROCEDURE — 96372 THER/PROPH/DIAG INJ SC/IM: CPT

## 2018-01-26 ENCOUNTER — HOSPITAL ENCOUNTER (OUTPATIENT)
Age: 73
Discharge: HOME | End: 2018-01-26
Payer: COMMERCIAL

## 2018-01-26 VITALS — BODY MASS INDEX: 22.6 KG/M2

## 2018-01-26 VITALS
DIASTOLIC BLOOD PRESSURE: 70 MMHG | SYSTOLIC BLOOD PRESSURE: 128 MMHG | OXYGEN SATURATION: 98 % | TEMPERATURE: 97.52 F | HEART RATE: 66 BPM | RESPIRATION RATE: 20 BRPM

## 2018-01-26 DIAGNOSIS — D63.1: ICD-10-CM

## 2018-01-26 DIAGNOSIS — N18.3: Primary | ICD-10-CM

## 2018-01-26 LAB
HCT VFR BLD CALC: 33.2 % (ref 37–47)
HGB BLD-MCNC: 11 G/DL (ref 12.2–16.2)
MCHC RBC-ENTMCNC: 33 G/DL (ref 31.8–35.4)
MCV RBC: 90.1 FL (ref 81–99)
MEAN CORPUSCULAR HEMOGLOBIN: 29.8 PG (ref 27–31.2)
PLATELET # BLD: 225 K/MM3 (ref 142–424)
RBC # BLD AUTO: 3.68 M/MM3 (ref 4.2–5.4)
WBC # BLD AUTO: 4.2 K/MM3 (ref 4.8–10.8)

## 2018-01-26 PROCEDURE — 85025 COMPLETE CBC W/AUTO DIFF WBC: CPT

## 2018-01-26 PROCEDURE — 96372 THER/PROPH/DIAG INJ SC/IM: CPT

## 2018-03-23 NOTE — PROGRESS NOTES
[facility] at [facility]  Office Progress Note  Janice Romero  1945  [phone]      Visit Date: 03/27/18    PCP: Fred Winter MD  [address] [place] [address] [place] [id number]    IDENTIFICATION: A 72 y.o. female [employer]  from [place]    Chief Complaint   Patient presents with   • Dizziness   • Fatigue       PROBLEM LIST:   1. CAD  1. 2011 Stents [facility] data def  2. 2014?- [facility]- EM after did not require HD  3. 2016 Stent per Dr Woodson -[facility] data def  4. 7/16/17 MPS: lexpet wnl, no significant ischemic ekg changes, rest EF 43%, stress EF 50%, baseline LBBB  2. HTN  3. HL   1. statin intolerant  4. TIA  1. Multiple recurrent w historical and current neuro evals benign  2. Noted in setting of labile bp  5. Hyperglycemia  6. Migraine HA  7. Resistant to plavix, on antiplatelet therapy w effient   8. CKD 2  9. R hand weakness post reported squirrel attack without skin break spring 2017  10. SX HX  1. BHARAT  2. Cholecystectomy  3. Tubal ligation    Allergies  Allergies   Allergen Reactions   • Simvastatin Shortness Of Breath   • Lexapro [Escitalopram] Other (See Comments)     tremors   • Acyclovir And Related Rash   • Altace [Ramipril] Rash   • Aspirin Rash   • Benicar [Olmesartan] Rash   • Bystolic [Nebivolol Hcl] Rash   • Cephalexin Rash   • Codeine Rash   • Dynacirc [Isradipine] Rash   • Factive [Gemifloxacin] Rash   • Levaquin [Levofloxacin In D5w] Rash   • Lisinopril Rash   • Norvasc [Amlodipine] Rash   • Penicillins Rash   • Rocephin [Ceftriaxone] Rash   • Septra [Sulfamethoxazole-Trimethoprim] Rash   • Talwin [Pentazocine] Rash   • Tekturna [Aliskiren] Rash   • Trimox [Amoxicillin]    • Verelan [Verapamil Hcl Er] Rash   • Zithromax [Azithromycin] Rash   • Zomig [Zolmitriptan] Rash       Current Medications    Current Outpatient Prescriptions:   •  albuterol (PROVENTIL HFA;VENTOLIN HFA) 108 (90 BASE) MCG/ACT inhaler, Inhale 2 puffs 4 (Four) Times a Day  "As Needed for Wheezing., Disp: , Rfl:   •  carvedilol (COREG) 3.125 MG tablet, Take 12.5 mg by mouth 2 (Two) Times a Day With Meals., Disp: , Rfl:   •  cholecalciferol (VITAMIN D3) 1000 UNITS tablet, Take 1,000 Units by mouth Daily., Disp: , Rfl:   •  Cyanocobalamin (VITAMIN B 12 PO), Take  by mouth., Disp: , Rfl:   •  EFFIENT 10 MG tablet, TAKE ONE TABLET BY MOUTH ONCE DAILY, Disp: 90 tablet, Rfl: 1  •  Garlic 1000 MG capsule, Take 1,000 mg by mouth Daily., Disp: , Rfl:   •  meclizine 25 MG chewable tablet chewable tablet, Chew 25 mg 3 (Three) Times a Day As Needed., Disp: , Rfl:   •  Multiple Vitamins-Minerals (MULTIVITAMIN ADULT PO), Take 1 tablet by mouth Daily., Disp: , Rfl:   •  pantoprazole (PROTONIX) 40 MG EC tablet, Take 40 mg by mouth Daily., Disp: , Rfl:   •  valsartan-hydrochlorothiazide (DIOVAN-HCT) 320-25 MG per tablet, , Disp: , Rfl:   •  amLODIPine (NORVASC) 2.5 MG tablet, Take 2 tablets by mouth Daily., Disp: 90 tablet, Rfl: 3      History of Present Illness   Pt here for overdue follow up. Was seen during hsopitalization for TIA at [facility] in July 2017 and at that time had a low risk lexipet. Her BP is still running too high, averaging 160s/90s at home. Pt states she has never been on amlodipine before. Reports she had been taking procrit and stopped taking it due to joint pain. Her Cr has been acceptable. She sees a nephrologist in [place] yearly.   Pt denies any chest pain, dyspnea, dyspnea on exertion, orthopnea, PND, palpitations, lower extremity edema, or claudication.    ROS:  All systems have been reviewed and are negative with the exception of those mentioned in the HPI.    OBJECTIVE:  Vitals:    03/27/18 0958   BP: 168/76   BP Location: Right arm   Patient Position: Sitting   Pulse: 56   Weight: 53.5 kg (118 lb)   Height: 154.9 cm (61\")     Physical Exam   Constitutional: She is oriented to person, place, and time. She appears well-developed and well-nourished. No distress.   Cardiovascular: " Normal rate, regular rhythm, normal heart sounds and intact distal pulses.    No murmur heard.  Pulses:       Radial pulses are 2+ on the right side, and 2+ on the left side.        Dorsalis pedis pulses are 2+ on the right side, and 2+ on the left side.        Posterior tibial pulses are 2+ on the right side, and 2+ on the left side.   Pulmonary/Chest: Effort normal and breath sounds normal. She has no wheezes. She has no rales.   Abdominal: Soft. Bowel sounds are normal. There is no tenderness. There is no guarding.   Musculoskeletal: She exhibits no edema or tenderness.   Neurological: She is alert and oriented to person, place, and time.   Skin: Skin is warm and dry. No rash noted.   Psychiatric: She has a normal mood and affect.   Nursing note and vitals reviewed.      Diagnostic Data:  Procedures      ASSESSMENT:   Diagnosis Plan   1. Coronary artery disease due to lipid rich plaque     2. Essential hypertension  amLODIPine (NORVASC) 2.5 MG tablet   3. Mixed hyperlipidemia         PLAN:  1. Stable with no anginal equivalent.  We will transition patient from Effient back to ASA 81 mg daily.  2. BP too high, we will add amlodipine 5 mg daily.  Patient reports that she has not been on this before despite listed allergy.  She is to call us with any concerns.  Patient instructed to call if her SBP is not averaging below 130 and we will further titrate her medication.    Fred Winter MD, thank you for referring Ms. Romero for evaluation.  I have forwarded my electronically generated recommendations to you for review.  Please do not hesitate to call with any questions.    Scribed for Diego Casas MD by Elisha Rosenthal PA-C. 3/27/2018  10:21 AM  I, Diego Casas MD, personally performed the services described in this documentation as scribed by the above named individual in my presence, and it is both accurate and complete.  3/27/2018  10:41 AM    Diego Casas MD, [facility]

## 2018-03-27 ENCOUNTER — OFFICE VISIT (OUTPATIENT)
Dept: CARDIOLOGY | Facility: CLINIC | Age: 73
End: 2018-03-27

## 2018-03-27 VITALS
HEART RATE: 56 BPM | SYSTOLIC BLOOD PRESSURE: 168 MMHG | WEIGHT: 118 LBS | HEIGHT: 61 IN | DIASTOLIC BLOOD PRESSURE: 76 MMHG | BODY MASS INDEX: 22.28 KG/M2

## 2018-03-27 DIAGNOSIS — I25.83 CORONARY ARTERY DISEASE DUE TO LIPID RICH PLAQUE: Primary | ICD-10-CM

## 2018-03-27 DIAGNOSIS — I10 ESSENTIAL HYPERTENSION: ICD-10-CM

## 2018-03-27 DIAGNOSIS — I25.10 CORONARY ARTERY DISEASE DUE TO LIPID RICH PLAQUE: Primary | ICD-10-CM

## 2018-03-27 DIAGNOSIS — E78.2 MIXED HYPERLIPIDEMIA: ICD-10-CM

## 2018-03-27 PROCEDURE — 99214 OFFICE O/P EST MOD 30 MIN: CPT | Performed by: INTERNAL MEDICINE

## 2018-03-27 RX ORDER — VALSARTAN AND HYDROCHLOROTHIAZIDE 320; 25 MG/1; MG/1
1 TABLET, FILM COATED ORAL DAILY
COMMUNITY
Start: 2018-03-09 | End: 2018-07-20 | Stop reason: HOSPADM

## 2018-03-27 RX ORDER — AMLODIPINE BESYLATE 2.5 MG/1
5 TABLET ORAL DAILY
Qty: 90 TABLET | Refills: 3 | Status: SHIPPED | OUTPATIENT
Start: 2018-03-27

## 2018-04-06 ENCOUNTER — TELEPHONE (OUTPATIENT)
Dept: CARDIOLOGY | Facility: CLINIC | Age: 73
End: 2018-04-06

## 2018-04-06 NOTE — TELEPHONE ENCOUNTER
Called and left VM that we are glad she is feeling well and BP and doing well. Monitor BP and contact us if she has any issues. Patient did not report numbers.

## 2018-07-19 ENCOUNTER — APPOINTMENT (OUTPATIENT)
Dept: CARDIOLOGY | Facility: HOSPITAL | Age: 73
End: 2018-07-19

## 2018-07-19 ENCOUNTER — APPOINTMENT (OUTPATIENT)
Dept: GENERAL RADIOLOGY | Facility: HOSPITAL | Age: 73
End: 2018-07-19

## 2018-07-19 ENCOUNTER — HOSPITAL ENCOUNTER (OUTPATIENT)
Facility: HOSPITAL | Age: 73
Setting detail: OBSERVATION
Discharge: HOME | End: 2018-07-20
Attending: EMERGENCY MEDICINE | Admitting: EMERGENCY MEDICINE

## 2018-07-19 DIAGNOSIS — N18.9 CHRONIC KIDNEY DISEASE, UNSPECIFIED CKD STAGE: ICD-10-CM

## 2018-07-19 DIAGNOSIS — Z86.79 HISTORY OF CORONARY ARTERY DISEASE: ICD-10-CM

## 2018-07-19 DIAGNOSIS — I10 ELEVATED BLOOD PRESSURE READING WITH DIAGNOSIS OF HYPERTENSION: ICD-10-CM

## 2018-07-19 DIAGNOSIS — R06.02 SHORTNESS OF BREATH: Primary | ICD-10-CM

## 2018-07-19 DIAGNOSIS — R06.09 DYSPNEA ON EXERTION: ICD-10-CM

## 2018-07-19 LAB
ALBUMIN SERPL-MCNC: 3.89 G/DL (ref 3.2–4.8)
ALBUMIN/GLOB SERPL: 0.9 G/DL (ref 1.5–2.5)
ALP SERPL-CCNC: 130 U/L (ref 25–100)
ALT SERPL W P-5'-P-CCNC: 17 U/L (ref 7–40)
ANION GAP SERPL CALCULATED.3IONS-SCNC: 7 MMOL/L (ref 3–11)
AST SERPL-CCNC: 30 U/L (ref 0–33)
BASOPHILS # BLD AUTO: 0.02 10*3/MM3 (ref 0–0.2)
BASOPHILS NFR BLD AUTO: 0.4 % (ref 0–1)
BILIRUB SERPL-MCNC: 0.3 MG/DL (ref 0.3–1.2)
BNP SERPL-MCNC: 71 PG/ML (ref 0–100)
BUN BLD-MCNC: 29 MG/DL (ref 9–23)
BUN/CREAT SERPL: 20.1 (ref 7–25)
CALCIUM SPEC-SCNC: 9.1 MG/DL (ref 8.7–10.4)
CHLORIDE SERPL-SCNC: 106 MMOL/L (ref 99–109)
CO2 SERPL-SCNC: 26 MMOL/L (ref 20–31)
CREAT BLD-MCNC: 1.44 MG/DL (ref 0.6–1.3)
DEPRECATED RDW RBC AUTO: 41.2 FL (ref 37–54)
EOSINOPHIL # BLD AUTO: 0.16 10*3/MM3 (ref 0–0.3)
EOSINOPHIL NFR BLD AUTO: 3.6 % (ref 0–3)
ERYTHROCYTE [DISTWIDTH] IN BLOOD BY AUTOMATED COUNT: 12.3 % (ref 11.3–14.5)
GFR SERPL CREATININE-BSD FRML MDRD: 36 ML/MIN/1.73
GLOBULIN UR ELPH-MCNC: 4.4 GM/DL
GLUCOSE BLD-MCNC: 100 MG/DL (ref 70–100)
GLUCOSE BLDC GLUCOMTR-MCNC: 145 MG/DL (ref 70–130)
HCT VFR BLD AUTO: 32.5 % (ref 34.5–44)
HGB BLD-MCNC: 10.7 G/DL (ref 11.5–15.5)
HOLD SPECIMEN: NORMAL
HOLD SPECIMEN: NORMAL
IMM GRANULOCYTES # BLD: 0.01 10*3/MM3 (ref 0–0.03)
IMM GRANULOCYTES NFR BLD: 0.2 % (ref 0–0.6)
LIPASE SERPL-CCNC: 78 U/L (ref 6–51)
LYMPHOCYTES # BLD AUTO: 0.89 10*3/MM3 (ref 0.6–4.8)
LYMPHOCYTES NFR BLD AUTO: 20 % (ref 24–44)
MCH RBC QN AUTO: 30.4 PG (ref 27–31)
MCHC RBC AUTO-ENTMCNC: 32.9 G/DL (ref 32–36)
MCV RBC AUTO: 92.3 FL (ref 80–99)
MONOCYTES # BLD AUTO: 0.25 10*3/MM3 (ref 0–1)
MONOCYTES NFR BLD AUTO: 5.6 % (ref 0–12)
NEUTROPHILS # BLD AUTO: 3.14 10*3/MM3 (ref 1.5–8.3)
NEUTROPHILS NFR BLD AUTO: 70.4 % (ref 41–71)
PLATELET # BLD AUTO: 223 10*3/MM3 (ref 150–450)
PMV BLD AUTO: 10.4 FL (ref 6–12)
POTASSIUM BLD-SCNC: 4.1 MMOL/L (ref 3.5–5.5)
PROT SERPL-MCNC: 8.3 G/DL (ref 5.7–8.2)
RBC # BLD AUTO: 3.52 10*6/MM3 (ref 3.89–5.14)
SODIUM BLD-SCNC: 139 MMOL/L (ref 132–146)
TROPONIN I SERPL-MCNC: 0 NG/ML (ref 0–0.07)
TROPONIN I SERPL-MCNC: 0 NG/ML (ref 0–0.07)
TROPONIN I SERPL-MCNC: 0.01 NG/ML
WBC NRBC COR # BLD: 4.46 10*3/MM3 (ref 3.5–10.8)
WHOLE BLOOD HOLD SPECIMEN: NORMAL
WHOLE BLOOD HOLD SPECIMEN: NORMAL

## 2018-07-19 PROCEDURE — 83880 ASSAY OF NATRIURETIC PEPTIDE: CPT | Performed by: EMERGENCY MEDICINE

## 2018-07-19 PROCEDURE — 25010000002 HEPARIN (PORCINE) PER 1000 UNITS: Performed by: NURSE PRACTITIONER

## 2018-07-19 PROCEDURE — 84484 ASSAY OF TROPONIN QUANT: CPT | Performed by: INTERNAL MEDICINE

## 2018-07-19 PROCEDURE — 85025 COMPLETE CBC W/AUTO DIFF WBC: CPT

## 2018-07-19 PROCEDURE — 99214 OFFICE O/P EST MOD 30 MIN: CPT | Performed by: INTERNAL MEDICINE

## 2018-07-19 PROCEDURE — 71045 X-RAY EXAM CHEST 1 VIEW: CPT

## 2018-07-19 PROCEDURE — 80053 COMPREHEN METABOLIC PANEL: CPT | Performed by: EMERGENCY MEDICINE

## 2018-07-19 PROCEDURE — G0378 HOSPITAL OBSERVATION PER HR: HCPCS

## 2018-07-19 PROCEDURE — 96372 THER/PROPH/DIAG INJ SC/IM: CPT

## 2018-07-19 PROCEDURE — 99284 EMERGENCY DEPT VISIT MOD MDM: CPT

## 2018-07-19 PROCEDURE — 93306 TTE W/DOPPLER COMPLETE: CPT

## 2018-07-19 PROCEDURE — 83690 ASSAY OF LIPASE: CPT | Performed by: EMERGENCY MEDICINE

## 2018-07-19 PROCEDURE — 93010 ELECTROCARDIOGRAM REPORT: CPT | Performed by: INTERNAL MEDICINE

## 2018-07-19 PROCEDURE — 93306 TTE W/DOPPLER COMPLETE: CPT | Performed by: INTERNAL MEDICINE

## 2018-07-19 PROCEDURE — 93005 ELECTROCARDIOGRAM TRACING: CPT | Performed by: EMERGENCY MEDICINE

## 2018-07-19 PROCEDURE — 93005 ELECTROCARDIOGRAM TRACING: CPT | Performed by: INTERNAL MEDICINE

## 2018-07-19 PROCEDURE — 94799 UNLISTED PULMONARY SVC/PX: CPT

## 2018-07-19 PROCEDURE — 82962 GLUCOSE BLOOD TEST: CPT

## 2018-07-19 PROCEDURE — 84484 ASSAY OF TROPONIN QUANT: CPT

## 2018-07-19 PROCEDURE — 93005 ELECTROCARDIOGRAM TRACING: CPT

## 2018-07-19 RX ORDER — HEPARIN SODIUM 5000 [USP'U]/ML
5000 INJECTION, SOLUTION INTRAVENOUS; SUBCUTANEOUS EVERY 12 HOURS SCHEDULED
Status: DISCONTINUED | OUTPATIENT
Start: 2018-07-19 | End: 2018-07-20 | Stop reason: HOSPADM

## 2018-07-19 RX ORDER — VALSARTAN 160 MG/1
320 TABLET ORAL
Status: DISCONTINUED | OUTPATIENT
Start: 2018-07-19 | End: 2018-07-20 | Stop reason: HOSPADM

## 2018-07-19 RX ORDER — CETIRIZINE HYDROCHLORIDE 10 MG/1
10 TABLET ORAL DAILY PRN
COMMUNITY

## 2018-07-19 RX ORDER — HYDRALAZINE HYDROCHLORIDE 50 MG/1
50 TABLET, FILM COATED ORAL EVERY 8 HOURS SCHEDULED
Status: DISCONTINUED | OUTPATIENT
Start: 2018-07-19 | End: 2018-07-20 | Stop reason: HOSPADM

## 2018-07-19 RX ORDER — ASPIRIN 81 MG/1
TABLET, CHEWABLE ORAL
Status: COMPLETED
Start: 2018-07-19 | End: 2018-07-19

## 2018-07-19 RX ORDER — ASPIRIN 81 MG/1
81 TABLET ORAL 2 TIMES DAILY
COMMUNITY

## 2018-07-19 RX ORDER — MECLIZINE HYDROCHLORIDE 25 MG/1
25 TABLET ORAL 3 TIMES DAILY PRN
Status: DISCONTINUED | OUTPATIENT
Start: 2018-07-19 | End: 2018-07-20 | Stop reason: HOSPADM

## 2018-07-19 RX ORDER — SODIUM CHLORIDE 450 MG/100ML
75 INJECTION, SOLUTION INTRAVENOUS CONTINUOUS
Status: DISCONTINUED | OUTPATIENT
Start: 2018-07-19 | End: 2018-07-20

## 2018-07-19 RX ORDER — ASPIRIN 81 MG/1
81 TABLET ORAL DAILY
Status: DISCONTINUED | OUTPATIENT
Start: 2018-07-20 | End: 2018-07-20 | Stop reason: HOSPADM

## 2018-07-19 RX ORDER — ALBUTEROL SULFATE 90 UG/1
2 AEROSOL, METERED RESPIRATORY (INHALATION) 4 TIMES DAILY PRN
Status: DISCONTINUED | OUTPATIENT
Start: 2018-07-19 | End: 2018-07-20 | Stop reason: HOSPADM

## 2018-07-19 RX ORDER — PANTOPRAZOLE SODIUM 40 MG/1
40 TABLET, DELAYED RELEASE ORAL
Status: DISCONTINUED | OUTPATIENT
Start: 2018-07-19 | End: 2018-07-20 | Stop reason: HOSPADM

## 2018-07-19 RX ORDER — B1/B2/B3/B5/B6/IRON/METH/CHOLN 2.5-18/15
1 LIQUID (ML) ORAL DAILY
COMMUNITY

## 2018-07-19 RX ORDER — SODIUM CHLORIDE 0.9 % (FLUSH) 0.9 %
1-10 SYRINGE (ML) INJECTION AS NEEDED
Status: DISCONTINUED | OUTPATIENT
Start: 2018-07-19 | End: 2018-07-20 | Stop reason: HOSPADM

## 2018-07-19 RX ORDER — LANOLIN ALCOHOL/MO/W.PET/CERES
2000 CREAM (GRAM) TOPICAL 2 TIMES DAILY
COMMUNITY

## 2018-07-19 RX ORDER — SODIUM CHLORIDE 0.9 % (FLUSH) 0.9 %
10 SYRINGE (ML) INJECTION AS NEEDED
Status: DISCONTINUED | OUTPATIENT
Start: 2018-07-19 | End: 2018-07-20 | Stop reason: HOSPADM

## 2018-07-19 RX ORDER — AMLODIPINE BESYLATE 5 MG/1
5 TABLET ORAL DAILY
Status: DISCONTINUED | OUTPATIENT
Start: 2018-07-19 | End: 2018-07-19

## 2018-07-19 RX ADMIN — NITROGLYCERIN 1 INCH: 20 OINTMENT TOPICAL at 15:41

## 2018-07-19 RX ADMIN — HEPARIN SODIUM 5000 UNITS: 5000 INJECTION, SOLUTION INTRAVENOUS; SUBCUTANEOUS at 21:37

## 2018-07-19 RX ADMIN — HYDRALAZINE HYDROCHLORIDE 50 MG: 50 TABLET, FILM COATED ORAL at 15:41

## 2018-07-19 RX ADMIN — HYDRALAZINE HYDROCHLORIDE 50 MG: 50 TABLET, FILM COATED ORAL at 21:35

## 2018-07-19 RX ADMIN — NITROGLYCERIN 1 INCH: 20 OINTMENT TOPICAL at 11:22

## 2018-07-19 RX ADMIN — SODIUM CHLORIDE 75 ML/HR: 4.5 INJECTION, SOLUTION INTRAVENOUS at 15:50

## 2018-07-19 RX ADMIN — PANTOPRAZOLE SODIUM 40 MG: 40 TABLET, DELAYED RELEASE ORAL at 15:50

## 2018-07-19 RX ADMIN — SODIUM CHLORIDE 75 ML/HR: 4.5 INJECTION, SOLUTION INTRAVENOUS at 15:42

## 2018-07-19 RX ADMIN — ASPIRIN 81 MG 81 MG: 81 TABLET ORAL at 10:15

## 2018-07-19 RX ADMIN — HEPARIN SODIUM 5000 UNITS: 5000 INJECTION, SOLUTION INTRAVENOUS; SUBCUTANEOUS at 15:50

## 2018-07-19 NOTE — ED PROVIDER NOTES
Subjective   Ms. Janice Romero is a 72 y.o. female who presents to the ED with c/o weakness and shortness of breath. She reports this weakness has been gradually worsening for the past 2 weeks. Her shortness of breath has been waxing and waning in that time period and reports she is not currently short of breath. She has a history of HTN, asthma, pin stroke x3, CKD, MI x3 with stent x2 and angioplasty. She denies smoking. Dr. Elam is her cardiologist. There are no other acute symptoms at this time.        History provided by:  Patient  Weakness - Generalized   Severity:  Moderate  Onset quality:  Gradual  Duration:  2 weeks  Timing:  Constant  Progression:  Worsening  Chronicity:  New  Relieved by:  None tried  Worsened by:  Nothing  Ineffective treatments:  None tried  Associated symptoms: no chest pain, no cough, no nausea and no vomiting        Review of Systems   Constitutional: Negative for diaphoresis.   Respiratory: Negative for cough.    Cardiovascular: Negative for chest pain and leg swelling.   Gastrointestinal: Negative for nausea and vomiting.   Neurological: Positive for weakness.   All other systems reviewed and are negative.      Past Medical History:   Diagnosis Date   • CKD (chronic kidney disease)    • Hypertension    • MI (myocardial infarction)    • Stroke (CMS/HCC)        Allergies   Allergen Reactions   • Simvastatin Shortness Of Breath   • Lexapro [Escitalopram] Other (See Comments)     tremors   • Acyclovir And Related Rash   • Altace [Ramipril] Rash   • Aspirin Rash   • Benicar [Olmesartan] Rash   • Bystolic [Nebivolol Hcl] Rash   • Cephalexin Rash   • Codeine Rash   • Dynacirc [Isradipine] Rash   • Factive [Gemifloxacin] Rash   • Levaquin [Levofloxacin In D5w] Rash   • Lisinopril Rash   • Norvasc [Amlodipine] Rash   • Penicillins Rash   • Rocephin [Ceftriaxone] Rash   • Septra [Sulfamethoxazole-Trimethoprim] Rash   • Talwin [Pentazocine] Rash   • Tekturna [Aliskiren] Rash   • Trimox  [Amoxicillin]    • Verelan [Verapamil Hcl Er] Rash   • Zithromax [Azithromycin] Rash   • Zomig [Zolmitriptan] Rash       Past Surgical History:   Procedure Laterality Date   • CHOLECYSTECTOMY     • CORONARY STENT PLACEMENT     • HYSTERECTOMY     • TUBAL ABDOMINAL LIGATION         Family History   Problem Relation Age of Onset   • Heart attack Mother    • Cancer Father    • Heart attack Maternal Aunt    • Heart attack Maternal Uncle    • Heart attack Maternal Grandmother        Social History     Social History   • Marital status:      Social History Main Topics   • Smoking status: Never Smoker   • Smokeless tobacco: Never Used   • Alcohol use No   • Drug use: No   • Sexual activity: Defer     Other Topics Concern   • Not on file         Objective   Physical Exam   Constitutional: She is oriented to person, place, and time. She appears well-developed and well-nourished. No distress.   HENT:   Head: Normocephalic and atraumatic.   Nose: Nose normal.   Eyes: Conjunctivae are normal. No scleral icterus.   Neck: Normal range of motion.   Cardiovascular: Regular rhythm.    Murmur (best heard over aorta) heard.   Systolic murmur is present with a grade of 2/6   Mild tachycardia.   Pulmonary/Chest: Effort normal and breath sounds normal. No respiratory distress.   Abdominal: Soft. There is no tenderness.   Musculoskeletal: Normal range of motion.   Neurological: She is alert and oriented to person, place, and time.   Skin: Skin is warm and dry.   Psychiatric: She has a normal mood and affect. Her behavior is normal.   Nursing note and vitals reviewed.      Procedures         ED Course  ED Course as of Jul 19 1541   Thu Jul 19, 2018   1054 Troponin I: 0.00 [RS]   1054 BNP: 71.0 [RS]   1055 Creatinine: (!) 1.44 [RS]   1117 Cardiology consult.  [RS]   1118 The patient does deny chest pain here in the emergency department but reports the weakness, shortness of breath, and dyspnea on exertion for the last 2-3 weeks.  She  states this aspect feels the exact same as prior acute coronary syndrome symptoms.  She states it is not as severe and there is no chest pain which she typically has however.  [RS]   1302 Troponin I: 0.00 [RS]      ED Course User Index  [RS] Aries Quintanilla MD     Recent Results (from the past 24 hour(s))   Comprehensive Metabolic Panel    Collection Time: 07/19/18 10:13 AM   Result Value Ref Range    Glucose 100 70 - 100 mg/dL    BUN 29 (H) 9 - 23 mg/dL    Creatinine 1.44 (H) 0.60 - 1.30 mg/dL    Sodium 139 132 - 146 mmol/L    Potassium 4.1 3.5 - 5.5 mmol/L    Chloride 106 99 - 109 mmol/L    CO2 26.0 20.0 - 31.0 mmol/L    Calcium 9.1 8.7 - 10.4 mg/dL    Total Protein 8.3 (H) 5.7 - 8.2 g/dL    Albumin 3.89 3.20 - 4.80 g/dL    ALT (SGPT) 17 7 - 40 U/L    AST (SGOT) 30 0 - 33 U/L    Alkaline Phosphatase 130 (H) 25 - 100 U/L    Total Bilirubin 0.3 0.3 - 1.2 mg/dL    eGFR Non African Amer 36 (L) >60 mL/min/1.73    Globulin 4.4 gm/dL    A/G Ratio 0.9 (L) 1.5 - 2.5 g/dL    BUN/Creatinine Ratio 20.1 7.0 - 25.0    Anion Gap 7.0 3.0 - 11.0 mmol/L   Lipase    Collection Time: 07/19/18 10:13 AM   Result Value Ref Range    Lipase 78 (H) 6 - 51 U/L   BNP    Collection Time: 07/19/18 10:13 AM   Result Value Ref Range    BNP 71.0 0.0 - 100.0 pg/mL   Light Blue Top    Collection Time: 07/19/18 10:13 AM   Result Value Ref Range    Extra Tube hold for add-on    Green Top (Gel)    Collection Time: 07/19/18 10:13 AM   Result Value Ref Range    Extra Tube Hold for add-ons.    Lavender Top    Collection Time: 07/19/18 10:13 AM   Result Value Ref Range    Extra Tube hold for add-on    Gold Top - SST    Collection Time: 07/19/18 10:13 AM   Result Value Ref Range    Extra Tube Hold for add-ons.    CBC Auto Differential    Collection Time: 07/19/18 10:13 AM   Result Value Ref Range    WBC 4.46 3.50 - 10.80 10*3/mm3    RBC 3.52 (L) 3.89 - 5.14 10*6/mm3    Hemoglobin 10.7 (L) 11.5 - 15.5 g/dL    Hematocrit 32.5 (L) 34.5 - 44.0 %    MCV  92.3 80.0 - 99.0 fL    MCH 30.4 27.0 - 31.0 pg    MCHC 32.9 32.0 - 36.0 g/dL    RDW 12.3 11.3 - 14.5 %    RDW-SD 41.2 37.0 - 54.0 fl    MPV 10.4 6.0 - 12.0 fL    Platelets 223 150 - 450 10*3/mm3    Neutrophil % 70.4 41.0 - 71.0 %    Lymphocyte % 20.0 (L) 24.0 - 44.0 %    Monocyte % 5.6 0.0 - 12.0 %    Eosinophil % 3.6 (H) 0.0 - 3.0 %    Basophil % 0.4 0.0 - 1.0 %    Immature Grans % 0.2 0.0 - 0.6 %    Neutrophils, Absolute 3.14 1.50 - 8.30 10*3/mm3    Lymphocytes, Absolute 0.89 0.60 - 4.80 10*3/mm3    Monocytes, Absolute 0.25 0.00 - 1.00 10*3/mm3    Eosinophils, Absolute 0.16 0.00 - 0.30 10*3/mm3    Basophils, Absolute 0.02 0.00 - 0.20 10*3/mm3    Immature Grans, Absolute 0.01 0.00 - 0.03 10*3/mm3   POC Troponin, Rapid    Collection Time: 07/19/18 10:17 AM   Result Value Ref Range    Troponin I 0.00 0.00 - 0.07 ng/mL   POC Troponin, Rapid    Collection Time: 07/19/18 12:29 PM   Result Value Ref Range    Troponin I 0.00 0.00 - 0.07 ng/mL   POC Glucose Once    Collection Time: 07/19/18  3:02 PM   Result Value Ref Range    Glucose 145 (H) 70 - 130 mg/dL     Note: In addition to lab results from this visit, the labs listed above may include labs taken at another facility or during a different encounter within the last 24 hours. Please correlate lab times with ED admission and discharge times for further clarification of the services performed during this visit.    XR Chest 1 View   Final Result   No acute cardiopulmonary process.       D:  07/19/2018   E:  07/19/2018       This report was finalized on 7/19/2018 12:09 PM by Dr. Clifton Glover.            Vitals:    07/19/18 1455 07/19/18 1459 07/19/18 1505 07/19/18 1512   BP:   178/84 174/78   BP Location:   Left arm Left arm   Patient Position:       Pulse: 58  53 52   Resp: 18      Temp:       TempSrc:       SpO2:  100%  100%   Weight:       Height:         Medications   sodium chloride 0.9 % flush 10 mL (not administered)   sodium chloride 0.9 % flush 1-10 mL (not  administered)   sodium chloride 0.45 % infusion (not administered)   albuterol (PROVENTIL HFA;VENTOLIN HFA) inhaler 2 puff (not administered)   meclizine (ANTIVERT) tablet 25 mg (not administered)   pantoprazole (PROTONIX) EC tablet 40 mg (not administered)   valsartan (DIOVAN) tablet 320 mg (not administered)   heparin (porcine) 5000 UNIT/ML injection 5,000 Units (not administered)   nitroglycerin (NITROSTAT) ointment 1 inch (not administered)   aspirin EC tablet 81 mg (not administered)   hydrALAZINE (APRESOLINE) tablet 50 mg (not administered)   aspirin 81 MG chewable tablet  - ADS Override Pull (81 mg  Given 7/19/18 1015)   nitroglycerin (NITROSTAT) ointment 1 inch (1 inch Topical Given 7/19/18 1122)     ECG/EMG Results (last 24 hours)     Procedure Component Value Units Date/Time    ECG 12 Lead [[id number]] Collected:  07/19/18 1011     Updated:  07/19/18 1336    ECG 12 Lead [[id number]] Collected:  07/19/18 1232     Updated:  07/19/18 1340                        MDM  Number of Diagnoses or Management Options  Chronic kidney disease, unspecified CKD stage:   Dyspnea on exertion:   Elevated blood pressure reading with diagnosis of hypertension:   History of coronary artery disease:   Shortness of breath:   Diagnosis management comments: ECG/EMG Results (last 24 hours)     Procedure Component Value Units Date/Time    ECG 12 Lead ([id number]) Collected:  07/19/18 1011     Updated:  07/19/18 1458    Narrative:       Test Reason : chest pain  Blood Pressure : **/** mmHG  Vent. Rate : 055 BPM     Atrial Rate : 055 BPM     P-R Int : 164 ms          QRS Dur : 128 ms      QT Int : 462 ms       P-R-T Axes : 071 031 081 degrees     QTc Int : 441 ms    Sinus bradycardia with marked sinus arrhythmia  Left bundle branch block  Abnormal ECG  When compared with ECG of 17-JUL-2017 11:37,  No significant change was found  Confirmed by PRASHANT STEVENS MD ([id number]) on 7/19/2018 2:58:13 PM    Referred By:  ED ME           Confirmed By:PRASHANT  HILDA WONG    ECG 12 Lead ([id number]) Collected:  07/19/18 1232     Updated:  07/19/18 1458    Narrative:       Test Reason : 2ND SET  Blood Pressure : **/** mmHG  Vent. Rate : 051 BPM     Atrial Rate : 051 BPM     P-R Int : 168 ms          QRS Dur : 134 ms      QT Int : 488 ms       P-R-T Axes : 072 -02 086 degrees     QTc Int : 449 ms    Sinus bradycardia  Left bundle branch block  Abnormal ECG  When compared with ECG of 19-JUL-2018 10:11, (Unconfirmed)  No significant change was found  Confirmed by PRASHANT STEVENS MD ([id number]) on 7/19/2018 2:58:24 PM    Referred By:  eric wong           Confirmed By:PRASHANT STEVENS MD    ECG 12 Lead ([id number]) Collected:  07/19/18 1503     Updated:  07/19/18 1504    Narrative:       Test Reason : Chest pain  Blood Pressure : **/** mmHG  Vent. Rate : 056 BPM     Atrial Rate : 056 BPM     P-R Int : 136 ms          QRS Dur : 134 ms      QT Int : 472 ms       P-R-T Axes : 055 -05 084 degrees     QTc Int : 455 ms    Sinus bradycardia  Left bundle branch block  Abnormal ECG  When compared with ECG of 19-JUL-2018 12:32,  No significant change was found    Referred By:             Confirmed By:              Amount and/or Complexity of Data Reviewed  Clinical lab tests: reviewed  Tests in the radiology section of CPT®: reviewed  Review and summarize past medical records: yes  Discuss the patient with other providers: yes  Independent visualization of images, tracings, or specimens: yes        Final diagnoses:   Shortness of breath   Dyspnea on exertion   History of coronary artery disease   Chronic kidney disease, unspecified CKD stage   Elevated blood pressure reading with diagnosis of hypertension       Documentation assistance provided by maritza Delong.  Information recorded by the maritza was done at my direction and has been verified and validated by me.     Domingo Delong  07/19/18 1047       Domingo Delong  07/19/18 1214       Domingo Delong  07/19/18 1416       Prashant Michelle  MD Dwight  07/19/18 8137

## 2018-07-19 NOTE — H&P
[facility]  Cardiovascular History and Physical      Janice Romero  20/20  [id number]  1945    DATE OF ADMISSION: 7/19/2018  DATE OF CONSULTATION:  07/19/18    MIGUEL ÁNGEL Hernadez    Chief complaint/ Reason for Consultation: Shortness of air  Consult Requested by: Aries Quintanilla MD    Problem List:  1. CAD  1. 2011 Stents [place] [facility] data def  2. 2014?- [facility]- EM after did not require HD  3. 2016 Stent per Dr Woodson -[place] data def  4. 7/16/17 MPS: lexpet wnl, no significant ischemic ekg changes, rest EF 43%, stress EF 50%, baseline LBBB  2. HTN  3. HL   1. statin intolerant  4. TIA  1. Multiple recurrent w historical and current neuro evals benign  2. Noted in setting of labile bp  5. Hyperglycemia  6. Migraine HA  7. Resistant to plavix, on antiplatelet therapy w effient   8. CKD 2  9. R hand weakness post reported squirrel attack without skin break spring 2017  10. SX HX  1. BHARAT  2. Cholecystectomy  3. Tubal ligation    History of Present Illness: Ms. Romero is a 73 y/o female with the above noted past medical history who presented to [facility] ED with complaints of weakness and shortness of breath. She reports having weakness and shortness of breath for the last several weeks. She reports that her weakness was worse this AM and she felt like she would not be able to make it from her car to her house without falling. She reports that she felt a wave of darkness wash over her. This was associated with shortness of breath which she attributes to her asthma and being outside in the heat. She did not try inhaler. She denies any other aggravating or alleviating factors. She has otherwise been in good health. Denies recent infections, fevers, chills. No chest pain, jaw, arm, shoulder or back pain. Denies palpitations, neurologic symptoms. No recent travel or prolonged sedentary periods. She does have a h/o CAD. She is s/p stents and angioplasty  around 2011. She states prior to her stents she was asymptomatic. Most recent MPS- 7/16/17 with no evidence of ischemia, EF 50%.     Past Medical History:   Diagnosis Date   • CKD (chronic kidney disease)    • Hypertension    • MI (myocardial infarction)    • Stroke (CMS/HCC)        Past Surgical History:   Procedure Laterality Date   • CHOLECYSTECTOMY     • CORONARY STENT PLACEMENT     • HYSTERECTOMY     • TUBAL ABDOMINAL LIGATION         No current facility-administered medications on file prior to encounter.      Current Outpatient Prescriptions on File Prior to Encounter   Medication Sig Dispense Refill   • albuterol (PROVENTIL HFA;VENTOLIN HFA) 108 (90 BASE) MCG/ACT inhaler Inhale 2 puffs 4 (Four) Times a Day As Needed for Wheezing.     • amLODIPine (NORVASC) 2.5 MG tablet Take 2 tablets by mouth Daily. 90 tablet 3   • carvedilol (COREG) 3.125 MG tablet Take 12.5 mg by mouth 2 (Two) Times a Day With Meals.     • cholecalciferol (VITAMIN D3) 1000 UNITS tablet Take 1,000 Units by mouth Daily.     • Cyanocobalamin (VITAMIN B 12 PO) Take  by mouth.     • EFFIENT 10 MG tablet TAKE ONE TABLET BY MOUTH ONCE DAILY 90 tablet 1   • Garlic 1000 MG capsule Take 1,000 mg by mouth Daily.     • meclizine 25 MG chewable tablet chewable tablet Chew 25 mg 3 (Three) Times a Day As Needed.     • Multiple Vitamins-Minerals (MULTIVITAMIN ADULT PO) Take 1 tablet by mouth Daily.     • pantoprazole (PROTONIX) 40 MG EC tablet Take 40 mg by mouth Daily.     • valsartan-hydrochlorothiazide (DIOVAN-HCT) 320-25 MG per tablet          Social History     Social History   • Marital status:      Spouse name: N/A   • Number of children: N/A   • Years of education: N/A     Occupational History   • Not on file.     Social History Main Topics   • Smoking status: Never Smoker   • Smokeless tobacco: Never Used   • Alcohol use No   • Drug use: No   • Sexual activity: Defer     Other Topics Concern   • Not on file     Social History Narrative  "  • No narrative on file       Family History   Problem Relation Age of Onset   • Heart attack Mother    • Cancer Father    • Heart attack Maternal Aunt    • Heart attack Maternal Uncle    • Heart attack Maternal Grandmother        REVIEW OF SYSTEMS:   14 point ROS was performed and is Negative except as outlined in HPI     Objective:     Vitals:    07/19/18 1001 07/19/18 1012   BP: (!) 181/76 163/91   BP Location: Left arm    Patient Position: Sitting    Pulse: 55 53   Resp: 16    Temp: 98.3 °F (36.8 °C)    TempSrc: Oral    SpO2: 100%    Weight: 52.2 kg (115 lb)    Height: 154.9 cm (61\")      Body mass index is 21.73 kg/m².  Flowsheet Rows      First Filed Value   Admission Height  154.9 cm (61\") Documented at 07/19/2018 1001   Admission Weight  52.2 kg (115 lb) Documented at 07/19/2018 1001        No intake or output data in the 24 hours ending 07/19/18 1225    Physical Exam   Constitutional: well-developed and well-nourished. No distress.   HENT: Normocephalic. Atraumatic.   Eyes: Conjunctivae are normal. No scleral icterus.   Neck: Supple. No JVD. No carotid bruits.   Cardiovascular: RRR. S1 and S2 normal. No rubs or gallops. + murmur.  Pulses:       Radial pulses are 2+ on the right side, and 2+ on the left side.       Dorsalis pedis pulses are 2+ on the right side, and 2+ on the left side.   Pulmonary/Chest: Non labored. CTA bilaterally.   Musculoskeletal:  exhibits no tenderness or deformity.   EXT: No swelling  Neurological: is alert and oriented to person, place, and time.   Skin: Skin is warm and dry. No rash noted. Non diaphoretic. No cyanosis or erythema. No pallor. Nails show no clubbing.   Psychiatric: Normal mood and affect. Speech is normal and behavior is normal.      Lab Review:                  Results from last 7 days  Lab Units 07/19/18  1013   SODIUM mmol/L 139   POTASSIUM mmol/L 4.1   CHLORIDE mmol/L 106   CO2 mmol/L 26.0   BUN mg/dL 29*   CREATININE mg/dL 1.44*   GLUCOSE mg/dL 100   CALCIUM " "mg/dL 9.1           Results from last 7 days  Lab Units 07/19/18  1013   WBC 10*3/mm3 4.46   HEMOGLOBIN g/dL 10.7*   HEMATOCRIT % 32.5*   PLATELETS 10*3/mm3 223                         I personally viewed and interpreted the patient's EKG/Telemetry data    Telemetry: SB 50's with 9 beat run of SVT    EKG: Sinus ke with PACs, chronic LBBB, 51 bpm   Assessment/Plan:   1) Shortness of air:   - Patient presented with weakness and shortness of air x several weeks, worse this AM (~8:30)  - No other associated data  - H/o asthma and CAD s/p stents x 2 (2011); most recent stress without ischemia and EF of 50%  - BNP 71.0; Troponin 0.00  - EKG without sig ST seg changes  - CXR: no acute process  - Likely multifactorial     2) Near syncope  - Patient reports feeling as if \"darkness washed over\" her  - likely s/t dehydration    3) Sinus ke  - symptomatic with fatigue and SOB    3) JD s/t dehydration  Creatinine 1.44, BUN 29 (baseline cr 1.1)    4) HTN  Hypertensive on admission, will adjust meds as needed    Plan:  - Admit for observation  - Will fluid resusscitate   - BMP in AM  - D/C coreg and HCTZ  - Obtain orthostatics  - Echocardiogram  - DVT prophy with subQ heparin s/t weight and renal insufficiency       Thank you for allowing me to participate in the care of Janice Romero. Feel free to contact me directly with any further questions or concerns.      Scribed for Abdiel Trevino MD by PETRA Sanchez, APRN. 7/19/2018  12:25 PM    Saw the patient in the ER and clinically she looks quite stable.  She has a history of CAD has not been having tightness heaviness squeezing pressure chest jaw throat arms.  Her primary reason for coming in his profound weakness in her legs today and she had a near syncopal episode.  She does have asthma but does not wheeze on a regular basis but does so one was hot and humid.  She's had no orthopnea she's had no significant motion edema.  She checks her vital signs at home and " says her blood pressure this morning was 128 systolic and her heart rate was 49.  According to the patient she moved her car and was she's walking back she felt profoundly weak like her legs would go but she felt weak all over like a limp wash cloth, she also has been having occasional lightheadedness upon standing.  On physical exam general she is awake and alert pleasant current heart rate of 51  HEENT there is no JVP no scleral icterus and no bruits  Respiratory-clear bilaterally with equal bilateral symmetrical expansion  Cardiovascular-the heart is bradycardic with a grade 2/6 systolic ejection murmur when I initially started the exam she had 8-10 beat run of SVT.  Skin-warm and dry withno edema  Clinically the patient is a little dehydrated with creatinine 1.4.  Also she has a left bundle branch block and with Coreg her heart rate high 40's to  low 50s but she's can tolerate that.  I agree with hydrating her,DC and her Coreg.  We'll keep her on a monitor watch for any arrhythmias such as bradycardia or SVT.  Her blood pressures high here but home she says is well controlled.  Since she has orthostatic symptoms we will check orthostatic vitals and since she's dry will DC the HCTZ component of her medication.  Patient is been overnight observation

## 2018-07-19 NOTE — PLAN OF CARE
Problem: Patient Care Overview  Goal: Plan of Care Review  Outcome: Ongoing (interventions implemented as appropriate)   07/19/18 1740   Coping/Psychosocial   Plan of Care Reviewed With patient   Plan of Care Review   Progress no change   OTHER   Outcome Summary Rapid response called when pt arrive to the floor. She complained of chest pain on exertion. Pain resolved at rest without any  intervention. EKG and labs assessed. Dr Trevino notified. Ordered aspirin 81mg, hydraliazine, and nitro paste, NPO after midnight for possibilty of a cath. Patient is sinus ke. HTN on arrival 170-180 SBP. Patient reports having a rash to aspirin but states she was taken off of effient and is currently tolerating aspirin at 81mg. She reports being allergic to amlodipine. That med was dc'd here. However, she reports taking amlodipine daily while being unaware that is was amlodipine. Patient has 1inch  nitro paste. No SOA, chest pain resolved.

## 2018-07-19 NOTE — CONSULTS
[facility] at [facility]  Cardiovascular History and Physical      Janice Romero  20/20  [id number]  1945    DATE OF ADMISSION: 7/19/2018  DATE OF CONSULTATION:  07/19/18    MIGUEL ÁNGEL Hernadez    Chief complaint/ Reason for Consultation: Shortness of air  Consult Requested by: Aries Quintanilla MD    Problem List:  1. CAD  1. 2011 Stents [place] group data def  2. 2014?- [facility]- EM after did not require HD  3. 2016 Stent per Dr Woodson -[place] data def  4. 7/16/17 MPS: lexpet wnl, no significant ischemic ekg changes, rest EF 43%, stress EF 50%, baseline LBBB  2. HTN  3. HL   1. statin intolerant  4. TIA  1. Multiple recurrent w historical and current neuro evals benign  2. Noted in setting of labile bp  5. Hyperglycemia  6. Migraine HA  7. Resistant to plavix, on antiplatelet therapy w effient   8. CKD 2  9. R hand weakness post reported squirrel attack without skin break spring 2017  10. SX HX  1. BHARAT  2. Cholecystectomy  3. Tubal ligation    History of Present Illness: Ms. Romero is a 73 y/o female with the above noted past medical history who presented to [facility] ED with complaints of weakness and shortness of breath. She reports having weakness and shortness of breath for the last several weeks. She reports that her weakness was worse this AM and she felt like she would not be able to make it from her car to her house without falling. She reports that she felt a wave of darkness wash over her. This was associated with shortness of breath which she attributes to her asthma and being outside in the heat. She did not try inhaler. She denies any other aggravating or alleviating factors. She has otherwise been in good health. Denies recent infections, fevers, chills. No chest pain, jaw, arm, shoulder or back pain. Denies palpitations, neurologic symptoms. No recent travel or prolonged sedentary periods. She does have a h/o CAD. She is s/p stents and angioplasty  around 2011. She states prior to her stents she was asymptomatic. Most recent MPS- 7/16/17 with no evidence of ischemia, EF 50%.     Past Medical History:   Diagnosis Date   • CKD (chronic kidney disease)    • Hypertension    • MI (myocardial infarction)    • Stroke (CMS/HCC)        Past Surgical History:   Procedure Laterality Date   • CHOLECYSTECTOMY     • CORONARY STENT PLACEMENT     • HYSTERECTOMY     • TUBAL ABDOMINAL LIGATION         No current facility-administered medications on file prior to encounter.      Current Outpatient Prescriptions on File Prior to Encounter   Medication Sig Dispense Refill   • albuterol (PROVENTIL HFA;VENTOLIN HFA) 108 (90 BASE) MCG/ACT inhaler Inhale 2 puffs 4 (Four) Times a Day As Needed for Wheezing.     • amLODIPine (NORVASC) 2.5 MG tablet Take 2 tablets by mouth Daily. 90 tablet 3   • carvedilol (COREG) 3.125 MG tablet Take 12.5 mg by mouth 2 (Two) Times a Day With Meals.     • cholecalciferol (VITAMIN D3) 1000 UNITS tablet Take 1,000 Units by mouth Daily.     • Cyanocobalamin (VITAMIN B 12 PO) Take  by mouth.     • EFFIENT 10 MG tablet TAKE ONE TABLET BY MOUTH ONCE DAILY 90 tablet 1   • Garlic 1000 MG capsule Take 1,000 mg by mouth Daily.     • meclizine 25 MG chewable tablet chewable tablet Chew 25 mg 3 (Three) Times a Day As Needed.     • Multiple Vitamins-Minerals (MULTIVITAMIN ADULT PO) Take 1 tablet by mouth Daily.     • pantoprazole (PROTONIX) 40 MG EC tablet Take 40 mg by mouth Daily.     • valsartan-hydrochlorothiazide (DIOVAN-HCT) 320-25 MG per tablet          Social History     Social History   • Marital status:      Spouse name: N/A   • Number of children: N/A   • Years of education: N/A     Occupational History   • Not on file.     Social History Main Topics   • Smoking status: Never Smoker   • Smokeless tobacco: Never Used   • Alcohol use No   • Drug use: No   • Sexual activity: Defer     Other Topics Concern   • Not on file     Social History Narrative  "  • No narrative on file       Family History   Problem Relation Age of Onset   • Heart attack Mother    • Cancer Father    • Heart attack Maternal Aunt    • Heart attack Maternal Uncle    • Heart attack Maternal Grandmother        REVIEW OF SYSTEMS:   14 point ROS was performed and is Negative except as outlined in HPI     Objective:     Vitals:    07/19/18 1001 07/19/18 1012   BP: (!) 181/76 163/91   BP Location: Left arm    Patient Position: Sitting    Pulse: 55 53   Resp: 16    Temp: 98.3 °F (36.8 °C)    TempSrc: Oral    SpO2: 100%    Weight: 52.2 kg (115 lb)    Height: 154.9 cm (61\")      Body mass index is 21.73 kg/m².  Flowsheet Rows      First Filed Value   Admission Height  154.9 cm (61\") Documented at 07/19/2018 1001   Admission Weight  52.2 kg (115 lb) Documented at 07/19/2018 1001        No intake or output data in the 24 hours ending 07/19/18 1225    Physical Exam   Constitutional: well-developed and well-nourished. No distress.   HENT: Normocephalic. Atraumatic.   Eyes: Conjunctivae are normal. No scleral icterus.   Neck: Supple. No JVD. No carotid bruits.   Cardiovascular: RRR. S1 and S2 normal. No rubs or gallops. + murmur.  Pulses:       Radial pulses are 2+ on the right side, and 2+ on the left side.       Dorsalis pedis pulses are 2+ on the right side, and 2+ on the left side.   Pulmonary/Chest: Non labored. CTA bilaterally.   Musculoskeletal:  exhibits no tenderness or deformity.   EXT: No swelling  Neurological: is alert and oriented to person, place, and time.   Skin: Skin is warm and dry. No rash noted. Non diaphoretic. No cyanosis or erythema. No pallor. Nails show no clubbing.   Psychiatric: Normal mood and affect. Speech is normal and behavior is normal.      Lab Review:                  Results from last 7 days  Lab Units 07/19/18  1013   SODIUM mmol/L 139   POTASSIUM mmol/L 4.1   CHLORIDE mmol/L 106   CO2 mmol/L 26.0   BUN mg/dL 29*   CREATININE mg/dL 1.44*   GLUCOSE mg/dL 100   CALCIUM " "mg/dL 9.1           Results from last 7 days  Lab Units 07/19/18  1013   WBC 10*3/mm3 4.46   HEMOGLOBIN g/dL 10.7*   HEMATOCRIT % 32.5*   PLATELETS 10*3/mm3 223                         I personally viewed and interpreted the patient's EKG/Telemetry data    Telemetry: Sinus ke with PACs, chronic LBBB, 51 bpm   Assessment/Plan:   1) Shortness of air:   - Patient presented with weakness and shortness of air x several weeks, worse this AM (~8:30)  - No other associated data  - H/o asthma and CAD s/p stents x 2 (2011); most recent stress without ischemia and EF of 50%  - BNP 71.0; Troponin 0.00  - EKG without sig ST seg changes  - CXR: no acute process  - Likely multifactorial     2) Near syncope  - Patient reports feeling as if \"darkness washed over\" her  - likely s/t dehydration    3) Sinus ke  - symptomatic with fatigue and SOB    3) JD s/t dehydration  Creatinine 1.44, BUN 29 (baseline cr 1.1)    4) HTN  Hypertensive on admission, will adjust meds as needed    Plan:  - Admit for observation  - Will fluid resusscitate   - BMP in AM  - D/C coreg and HCTZ  - Obtain orthostatics  - Echocardiogram  - DVT prophy with subQ heparin s/t weight and renal insufficiency       Thank you for allowing me to participate in the care of Janice Romero. Feel free to contact me directly with any further questions or concerns.      Scribed for Abdiel Trevino MD by PETRA Sanchez, APRN. 7/19/2018  12:25 PM     Saw the patient in the ER and clinically she looks quite stable.  She has a history of CAD has not been having tightness heaviness squeezing pressure chest jaw throat arms.  Her primary reason for coming in his profound weakness in her legs today and she had a near syncopal episode.  She does have asthma but does not wheeze on a regular basis but does so one was hot and humid.  She's had no orthopnea she's had no significant motion edema.  She checks her vital signs at home and says her blood pressure this morning " was 128 systolic and her heart rate was 49.  According to the patient she moved her car and was she's walking back she felt profoundly weak like her legs would go but she felt weak all over like a limp wash cloth, she also has been having occasional lightheadedness upon standing.  On physical exam general she is awake and alert pleasant current heart rate of 51  HEENT there is no JVP no scleral icterus and no bruits  Respiratory-clear bilaterally with equal bilateral symmetrical expansion  Cardiovascular-the heart is bradycardic with a grade 2/6 systolic ejection murmur when I initially started the exam she had 8-10 beat run of SVT.  Skin-warm and dry withno edema  Clinically the patient is a little dehydrated with creatinine 1.4.  Also she has a left bundle branch block and with Coreg her heart rate high 40's to  low 50s but she's can tolerate that.  I agree with hydrating her,DC and her Coreg.  We'll keep her on a monitor watch for any arrhythmias such as bradycardia or SVT.  Her blood pressures high here but home she says is well controlled.  Since she has orthostatic symptoms we will check orthostatic vitals and since she's dry will DC the HCTZ component of her medication.  Patient is been overnight observation

## 2018-07-20 VITALS
BODY MASS INDEX: 22.47 KG/M2 | SYSTOLIC BLOOD PRESSURE: 129 MMHG | DIASTOLIC BLOOD PRESSURE: 58 MMHG | TEMPERATURE: 98 F | HEIGHT: 61 IN | OXYGEN SATURATION: 97 % | HEART RATE: 63 BPM | WEIGHT: 119 LBS | RESPIRATION RATE: 14 BRPM

## 2018-07-20 LAB
ANION GAP SERPL CALCULATED.3IONS-SCNC: 8 MMOL/L (ref 3–11)
BH CV ECHO MEAS - AO MAX PG (FULL): 7 MMHG
BH CV ECHO MEAS - AO MAX PG: 11 MMHG
BH CV ECHO MEAS - AO ROOT AREA (BSA CORRECTED): 2.3
BH CV ECHO MEAS - AO ROOT AREA: 9.3 CM^2
BH CV ECHO MEAS - AO ROOT DIAM: 3.4 CM
BH CV ECHO MEAS - AO V2 MAX: 164.3 CM/SEC
BH CV ECHO MEAS - AVA(V,A): 1.4 CM^2
BH CV ECHO MEAS - AVA(V,D): 1.4 CM^2
BH CV ECHO MEAS - BSA(HAYCOCK): 1.5 M^2
BH CV ECHO MEAS - BSA: 1.5 M^2
BH CV ECHO MEAS - BZI_BMI: 21.7 KILOGRAMS/M^2
BH CV ECHO MEAS - BZI_METRIC_HEIGHT: 154.9 CM
BH CV ECHO MEAS - BZI_METRIC_WEIGHT: 52.2 KG
BH CV ECHO MEAS - CONTRAST EF (2CH): 65.1 ML/M^2
BH CV ECHO MEAS - CONTRAST EF 4CH: 47.8 ML/M^2
BH CV ECHO MEAS - EDV(CUBED): 68.5 ML
BH CV ECHO MEAS - EDV(MOD-SP2): 86 ML
BH CV ECHO MEAS - EDV(MOD-SP4): 90 ML
BH CV ECHO MEAS - EDV(TEICH): 73.9 ML
BH CV ECHO MEAS - EF(CUBED): 58.1 %
BH CV ECHO MEAS - EF(MOD-SP2): 65.1 %
BH CV ECHO MEAS - EF(MOD-SP4): 47.8 %
BH CV ECHO MEAS - EF(TEICH): 50.2 %
BH CV ECHO MEAS - ESV(CUBED): 28.7 ML
BH CV ECHO MEAS - ESV(MOD-SP2): 30 ML
BH CV ECHO MEAS - ESV(MOD-SP4): 47 ML
BH CV ECHO MEAS - ESV(TEICH): 36.8 ML
BH CV ECHO MEAS - FS: 25.2 %
BH CV ECHO MEAS - IVS/LVPW: 1.1
BH CV ECHO MEAS - IVSD: 1.1 CM
BH CV ECHO MEAS - LA DIMENSION: 4 CM
BH CV ECHO MEAS - LA/AO: 1.1
BH CV ECHO MEAS - LAT PEAK E' VEL: 5.5 CM/SEC
BH CV ECHO MEAS - LV DIASTOLIC VOL/BSA (35-75): 60.3 ML/M^2
BH CV ECHO MEAS - LV MASS(C)D: 144.1 GRAMS
BH CV ECHO MEAS - LV MASS(C)DI: 96.5 GRAMS/M^2
BH CV ECHO MEAS - LV MAX PG: 4 MMHG
BH CV ECHO MEAS - LV SYSTOLIC VOL/BSA (12-30): 31.5 ML/M^2
BH CV ECHO MEAS - LV V1 MAX: 100 CM/SEC
BH CV ECHO MEAS - LVIDD: 4.1 CM
BH CV ECHO MEAS - LVIDS: 3.1 CM
BH CV ECHO MEAS - LVLD AP2: 7 CM
BH CV ECHO MEAS - LVLD AP4: 7.1 CM
BH CV ECHO MEAS - LVLS AP2: 5.5 CM
BH CV ECHO MEAS - LVLS AP4: 5.8 CM
BH CV ECHO MEAS - LVOT AREA (M): 2.3 CM^2
BH CV ECHO MEAS - LVOT AREA: 2.2 CM^2
BH CV ECHO MEAS - LVOT DIAM: 1.7 CM
BH CV ECHO MEAS - LVPWD: 1 CM
BH CV ECHO MEAS - MED PEAK E' VEL: 5.46 CM/SEC
BH CV ECHO MEAS - MV A MAX VEL: 111.6 CM/SEC
BH CV ECHO MEAS - MV DEC TIME: 0.15 SEC
BH CV ECHO MEAS - MV E MAX VEL: 110.1 CM/SEC
BH CV ECHO MEAS - MV E/A: 0.99
BH CV ECHO MEAS - PULM DIAS VEL: 40.7 CM/SEC
BH CV ECHO MEAS - PULM S/D: 1.9
BH CV ECHO MEAS - PULM SYS VEL: 76.2 CM/SEC
BH CV ECHO MEAS - RAP SYSTOLE: 3 MMHG
BH CV ECHO MEAS - RVDD: 2.7 CM
BH CV ECHO MEAS - RVSP: 32 MMHG
BH CV ECHO MEAS - SI(CUBED): 26.6 ML/M^2
BH CV ECHO MEAS - SI(MOD-SP2): 37.5 ML/M^2
BH CV ECHO MEAS - SI(MOD-SP4): 28.8 ML/M^2
BH CV ECHO MEAS - SI(TEICH): 24.8 ML/M^2
BH CV ECHO MEAS - SV(CUBED): 39.8 ML
BH CV ECHO MEAS - SV(MOD-SP2): 56 ML
BH CV ECHO MEAS - SV(MOD-SP4): 43 ML
BH CV ECHO MEAS - SV(TEICH): 37.1 ML
BH CV ECHO MEAS - TAPSE (>1.6): 2.1 CM2
BH CV ECHO MEAS - TR MAX V: 29 MMHG
BH CV ECHO MEAS - TR MAX VEL: 229.9 CM/SEC
BH CV ECHO MEASUREMENTS AVERAGE E/E' RATIO: 20.09
BH CV VAS BP LEFT ARM: NORMAL MMHG
BH CV XLRA - RV BASE: 3 CM
BH CV XLRA - RV LENGTH: 6 CM
BH CV XLRA - RV MID: 2.8 CM
BH CV XLRA - TDI S': 14.7 CM/SEC
BUN BLD-MCNC: 21 MG/DL (ref 9–23)
BUN/CREAT SERPL: 17.8 (ref 7–25)
CALCIUM SPEC-SCNC: 8.4 MG/DL (ref 8.7–10.4)
CHLORIDE SERPL-SCNC: 106 MMOL/L (ref 99–109)
CO2 SERPL-SCNC: 23 MMOL/L (ref 20–31)
CREAT BLD-MCNC: 1.18 MG/DL (ref 0.6–1.3)
GFR SERPL CREATININE-BSD FRML MDRD: 45 ML/MIN/1.73
GLUCOSE BLD-MCNC: 95 MG/DL (ref 70–100)
LEFT ATRIUM VOLUME INDEX: 33.6 ML/M2
LV EF 2D ECHO EST: 55 %
MAXIMAL PREDICTED HEART RATE: 150 BPM
POTASSIUM BLD-SCNC: 3.8 MMOL/L (ref 3.5–5.5)
SODIUM BLD-SCNC: 137 MMOL/L (ref 132–146)
STRESS TARGET HR: 128 BPM

## 2018-07-20 PROCEDURE — 96372 THER/PROPH/DIAG INJ SC/IM: CPT

## 2018-07-20 PROCEDURE — 25010000002 HEPARIN (PORCINE) PER 1000 UNITS: Performed by: NURSE PRACTITIONER

## 2018-07-20 PROCEDURE — 99214 OFFICE O/P EST MOD 30 MIN: CPT | Performed by: INTERNAL MEDICINE

## 2018-07-20 PROCEDURE — G0378 HOSPITAL OBSERVATION PER HR: HCPCS

## 2018-07-20 PROCEDURE — 80048 BASIC METABOLIC PNL TOTAL CA: CPT | Performed by: NURSE PRACTITIONER

## 2018-07-20 RX ORDER — LOSARTAN POTASSIUM 100 MG/1
100 TABLET ORAL DAILY
Qty: 90 TABLET | Refills: 3 | Status: SHIPPED | OUTPATIENT
Start: 2018-07-20

## 2018-07-20 RX ORDER — VALSARTAN 320 MG/1
320 TABLET ORAL
Qty: 30 TABLET | Refills: 6 | Status: SHIPPED | OUTPATIENT
Start: 2018-07-21 | End: 2018-07-20 | Stop reason: CLARIF

## 2018-07-20 RX ORDER — HYDRALAZINE HYDROCHLORIDE 50 MG/1
50 TABLET, FILM COATED ORAL EVERY 8 HOURS SCHEDULED
Qty: 90 TABLET | Refills: 6 | Status: SHIPPED | OUTPATIENT
Start: 2018-07-20

## 2018-07-20 RX ADMIN — HEPARIN SODIUM 5000 UNITS: 5000 INJECTION, SOLUTION INTRAVENOUS; SUBCUTANEOUS at 09:26

## 2018-07-20 RX ADMIN — VALSARTAN 320 MG: 160 TABLET, FILM COATED ORAL at 09:26

## 2018-07-20 RX ADMIN — PANTOPRAZOLE SODIUM 40 MG: 40 TABLET, DELAYED RELEASE ORAL at 05:15

## 2018-07-20 RX ADMIN — HYDRALAZINE HYDROCHLORIDE 50 MG: 50 TABLET, FILM COATED ORAL at 14:22

## 2018-07-20 RX ADMIN — ASPIRIN 81 MG: 81 TABLET, COATED ORAL at 09:26

## 2018-07-20 RX ADMIN — HYDRALAZINE HYDROCHLORIDE 50 MG: 50 TABLET, FILM COATED ORAL at 05:15

## 2018-07-20 RX ADMIN — NITROGLYCERIN 1 INCH: 20 OINTMENT TOPICAL at 05:16

## 2018-07-20 NOTE — PROGRESS NOTES
"[facility] at [facility]  PROGRESS NOTE      Date of Admission: 7/19/2018  Length of Stay: 0  Primary Care Physician: Corinne Rossi, MIGUEL ÁNGEL    Chief Complaint:SOB    Problem List:  1. CAD  1. 2011 Stents [place] [facility] data def  2. 2014?- [facility]- EM after did not require HD  3. 2016 Stent per Dr Woodson -[place] data def  4. 7/16/17 MPS: lexpet wnl, no significant ischemic ekg changes, rest EF 43%, stress EF 50%, baseline LBBB  5. SOB, Dehydration, Bradycarida- Admit to [facility] 7/19/18, Negative troponin levels.  Symptoms improved off BB and with IV fluids.   2. HTN  3. Bradycardia-Coreg discontinued  4. HLD  1. statin intolerant  5. TIA  1. Multiple recurrent w historical and current neuro evals benign  2. Noted in setting of labile bp  6. Hyperglycemia  7. Migraine HA  8. Resistant to plavix, on antiplatelet therapy w effient   9. CKD 2  10. R hand weakness post reported squirrel attack without skin break spring 2017  11. Chronic LBBB  12. SX HX  1. BHARAT  2. Cholecystectomy  3. Tubal ligation         Subjective      HPI: Today Mrs. Romero states that she feels like her symptoms are nearly resolved off of the carvedilol and the with HCTZ.  She had episode of chest pain yesterday with follow-up negative troponin level.  She feels like her breathing is improved but has not been ambulating much since admission.      Objective   Vital Signs:  Temp:  [97.9 °F (36.6 °C)-98.3 °F (36.8 °C)] 98.2 °F (36.8 °C)  Heart Rate:  [51-62] 57  Resp:  [16-18] 16  BP: (120-181)/(60-91) 129/60    Intake/Output Summary (Last 24 hours) at 07/20/18 0844  Last data filed at 07/19/18 2053   Gross per 24 hour   Intake              120 ml   Output                0 ml   Net              120 ml     Flowsheet Rows      First Filed Value   Admission Height  154.9 cm (61\") Documented at 07/19/2018 1001   Admission Weight  52.2 kg (115 lb) Documented at 07/19/2018 1001          Physical Exam:   HEENT: " Fundiscopic deferred, otherwise unremarkable.  NECK: No Jugular venous distention, adenopathy, or thyromegaly noted.   HEART: No discrete PMI is noted. The 1st and 2nd heart sounds are normal. 2/6 CRYSTAL. No gallops, rubs, clicks, or other sounds are noted.  LUNGS: Clear to auscultation.  ABDOMEN: Flat without evidence of organomegaly, masses, or tenderness.  NEUROLOGIC: No focal abnormalities involving strength or sensation are noted.   EXTREMITIES: No clubbing, cyanosis, or edema noted.         Results Review:     Results from last 7 days  Lab Units 07/20/18  0456 07/19/18  1013   SODIUM mmol/L 137 139   POTASSIUM mmol/L 3.8 4.1   CHLORIDE mmol/L 106 106   CO2 mmol/L 23.0 26.0   BUN mg/dL 21 29*   CREATININE mg/dL 1.18 1.44*   GLUCOSE mg/dL 95 100   CALCIUM mg/dL 8.4* 9.1       Results from last 7 days  Lab Units 07/19/18  1508   TROPONIN I ng/mL 0.009       Results from last 7 days  Lab Units 07/19/18  1013   WBC 10*3/mm3 4.46   HEMOGLOBIN g/dL 10.7*   HEMATOCRIT % 32.5*   PLATELETS 10*3/mm3 223                   Results from last 7 days  Lab Units 07/19/18  1013   BNP pg/mL 71.0               Current Medications:    aspirin 81 mg Oral Daily   heparin (porcine) 5,000 Units Subcutaneous Q12H   hydrALAZINE 50 mg Oral Q8H   nitroglycerin 1 inch Topical Q6H   pantoprazole 40 mg Oral Q AM   valsartan 320 mg Oral Q24H       sodium chloride 75 mL/hr Last Rate: 75 mL/hr (07/19/18 1550)         I reviewed the patient's new clinical results.  I personally viewed and interpreted the patient's EKG/Telemetry data    Assessment and Plan:     1. SOB/Chest pain- negative troponin  2. Near Syncope-no recurrent events   3. Sinus Bradycardia-Improved off Coreg  4. HTN-Controlled  5. JD on CKD- Scr improved from 1.4 to 1.1 with IV fluids.     · Review Echo  · Ambulate/PT  · Consider discharge in am if no recurrent symptoms   · Follow up with Dr. Yessenia Duran clinic 2-3 weeks.  · Hold BB and HCTZ  · If recurrent chest pain follow  up MPS in our office     EAN Fowler as scribe for Dr Casas  07/20/18  8:44 AM   I, devon casas md, personally performed the services described in this documentation as scribed by the above named individual in my presence, and it is both accurate and complete.  7/20/2018  12:44 PM

## 2018-07-20 NOTE — PLAN OF CARE
Problem: Patient Care Overview  Goal: Plan of Care Review  Outcome: Ongoing (interventions implemented as appropriate)   07/20/18 0625   Coping/Psychosocial   Plan of Care Reviewed With patient   Plan of Care Review   Progress no change   OTHER   Outcome Summary Patient rested comfortably throughout night. Patient did not report pain during night. Patient is alert and oriented. Vital signs remain stable. Will continue to monitor.      Goal: Individualization and Mutuality  Outcome: Ongoing (interventions implemented as appropriate)    Goal: Discharge Needs Assessment  Outcome: Ongoing (interventions implemented as appropriate)      Problem: Cardiac: ACS (Acute Coronary Syndrome) (Adult)  Goal: Signs and Symptoms of Listed Potential Problems Will be Absent, Minimized or Managed (Cardiac: ACS)  Outcome: Ongoing (interventions implemented as appropriate)      Problem: Fall Risk (Adult)  Goal: Identify Related Risk Factors and Signs and Symptoms  Outcome: Ongoing (interventions implemented as appropriate)    Goal: Absence of Fall  Outcome: Ongoing (interventions implemented as appropriate)

## 2018-07-20 NOTE — PROGRESS NOTES
Discharge Planning Assessment  [facility]     Patient Name: Janice Romero  MRN: [id number]  Today's Date: 7/20/2018    Admit Date: 7/19/2018          Discharge Needs Assessment     Row Name 07/20/18 0834       Living Environment    Lives With alone    Current Living Arrangements home/apartment/condo   Lives alone in 1 level home in [place]      Primary Care Provided by self    Provides Primary Care For no one    Family Caregiver Names Her son, Abdiel Romero.  ph # [phone].    Quality of Family Relationships supportive    Able to Return to Prior Arrangements yes       Resource/Environmental Concerns    Resource/Environmental Concerns none    Transportation Concerns car, none       Transition Planning    Patient/Family Anticipates Transition to home    Transportation Anticipated family or friend will provide       Discharge Needs Assessment    Readmission Within the Last 30 Days no previous admission in last 30 days    Concerns to be Addressed adjustment to diagnosis/illness;denies needs/concerns at this time;no discharge needs identified    Equipment Currently Used at Home cane, straight            Discharge Plan     Row Name 07/20/18 0836       Plan    Plan Home c family to assist.    Patient/Family in Agreement with Plan yes    Plan Comments Talked to Ms. Romero at .  She lives in 1 level home in [place] [facility]. alone.  Her son, Abdiel Romero, and daughter are supportive and can assist her as needed.  She has 2 canes at home to use prn.  She is independent c ADL's.  She sees Yael Rossi NP for medical needs.  She uses [facility] in [facility] for her Rx.  Her insurance covers most of cost of her medications.  She is enrolled in Meds to Bed program at [facility].  Her goal is to return home c assist from her 2 adult children.  OWEN will shona for needs.      Final Discharge Disposition Code 01 - home or self-care        Destination     No service coordination in this encounter.      Durable Medical  Equipment     No service coordination in this encounter.      Dialysis/Infusion     No service coordination in this encounter.      Home Medical Care     No service coordination in this encounter.      Social Care     No service coordination in this encounter.        Expected Discharge Date and Time     Expected Discharge Date Expected Discharge Time    Jul 21, 2018               Demographic Summary     Row Name 07/20/18 0832       General Information    Admission Type observation    Arrived From emergency department    Referral Source admission list    Reason for Consult discharge planning    Preferred Language English    General Information Comments Her PCP is Yael Rossi NP.       Contact Information    Permission Granted to Share Info With             Functional Status     Row Name 07/20/18 0834       Functional Status    Usual Activity Tolerance excellent    Current Activity Tolerance moderate       Functional Status, IADL    Medications independent    Meal Preparation independent    Housekeeping independent    Laundry independent    Shopping independent       Mental Status Summary    Recent Changes in Mental Status/Cognitive Functioning no changes       Employment/    Employment Status retired            Psychosocial    No documentation.           Abuse/Neglect    No documentation.           Legal    No documentation.           Substance Abuse    No documentation.           Patient Forms    No documentation.         Tc Denis RN

## 2018-07-20 NOTE — TELEPHONE ENCOUNTER
Called patient an informed her we received a notification from her pharmacy that valsartan is recalled and we will send in a script for losartan 100mg. Patient stated she has never taken this medication and is not allergic to her knowledge. Informed her to discuss recall further with pharmacy and let us know how she tolerates the new medication. Patient verbalized understanding.

## 2018-08-20 ENCOUNTER — HOSPITAL ENCOUNTER (OUTPATIENT)
Age: 73
End: 2018-08-20
Payer: COMMERCIAL

## 2018-08-20 DIAGNOSIS — J45.20: Primary | ICD-10-CM

## 2018-08-20 PROCEDURE — 71046 X-RAY EXAM CHEST 2 VIEWS: CPT

## 2018-08-27 ENCOUNTER — HOSPITAL ENCOUNTER (OUTPATIENT)
Dept: HOSPITAL 22 - LAB | Age: 73
Discharge: HOME | End: 2018-08-27
Payer: COMMERCIAL

## 2018-08-27 VITALS
RESPIRATION RATE: 18 BRPM | TEMPERATURE: 97.88 F | SYSTOLIC BLOOD PRESSURE: 152 MMHG | OXYGEN SATURATION: 100 % | DIASTOLIC BLOOD PRESSURE: 60 MMHG | HEART RATE: 74 BPM

## 2018-08-27 VITALS
OXYGEN SATURATION: 99 % | SYSTOLIC BLOOD PRESSURE: 149 MMHG | RESPIRATION RATE: 18 BRPM | HEART RATE: 76 BPM | DIASTOLIC BLOOD PRESSURE: 64 MMHG

## 2018-08-27 VITALS
SYSTOLIC BLOOD PRESSURE: 140 MMHG | HEART RATE: 79 BPM | DIASTOLIC BLOOD PRESSURE: 69 MMHG | OXYGEN SATURATION: 99 % | RESPIRATION RATE: 18 BRPM

## 2018-08-27 DIAGNOSIS — E87.6: ICD-10-CM

## 2018-08-27 DIAGNOSIS — R53.83: Primary | ICD-10-CM

## 2018-08-27 DIAGNOSIS — R53.81: ICD-10-CM

## 2018-08-27 LAB
ALBUMIN LEVEL: 3 GM/DL (ref 3.4–5)
ALBUMIN/GLOB SERPL: 0.7 {RATIO} (ref 1.1–1.8)
ALP ISO SERPL-ACNC: 114 U/L (ref 46–116)
ALT SERPLBLD-CCNC: 19 U/L (ref 12–78)
ANION GAP SERPL CALC-SCNC: 15.2 MEQ/L (ref 5–15)
AST SERPL QL: 19 U/L (ref 15–37)
BILIRUBIN,TOTAL: 0.3 MG/DL (ref 0.2–1)
BUN SERPL-MCNC: 15 MG/DL (ref 7–18)
CALCIUM SPEC-MCNC: 8.4 MG/DL (ref 8.5–10.1)
CHLORIDE SPEC-SCNC: 100 MMOL/L (ref 98–107)
CO2 SERPL-SCNC: 26 MMOL/L (ref 21–32)
CREAT BLD-SCNC: 1.28 MG/DL (ref 0.55–1.02)
ESTIMATED GLOMERULAR FILT RATE: 41 ML/MIN (ref 60–?)
GFR (AFRICAN AMERICAN): 50 ML/MIN (ref 60–?)
GLOBULIN SER CALC-MCNC: 4.4 GM/DL (ref 1.3–3.2)
GLUCOSE: 165 MG/DL (ref 74–106)
HCT VFR BLD CALC: 30.8 % (ref 37–47)
HGB BLD-MCNC: 10.2 G/DL (ref 12.2–16.2)
MAGNESIUM: 1.7 MG/DL (ref 1.4–2.2)
MCHC RBC-ENTMCNC: 33.1 G/DL (ref 31.8–35.4)
MCV RBC: 90.8 FL (ref 81–99)
MEAN CORPUSCULAR HEMOGLOBIN: 30 PG (ref 27–31.2)
PLATELET # BLD: 294 K/MM3 (ref 142–424)
POTASSIUM: 3.2 MMOL/L (ref 3.5–5.1)
PROT SERPL-MCNC: 7.4 GM/DL (ref 6.4–8.2)
RBC # BLD AUTO: 3.39 M/MM3 (ref 4.2–5.4)
SODIUM SPEC-SCNC: 138 MMOL/L (ref 136–145)
WBC # BLD AUTO: 4.6 K/MM3 (ref 4.8–10.8)

## 2018-08-27 PROCEDURE — 96360 HYDRATION IV INFUSION INIT: CPT

## 2018-08-27 PROCEDURE — 80053 COMPREHEN METABOLIC PANEL: CPT

## 2018-08-27 PROCEDURE — 85025 COMPLETE CBC W/AUTO DIFF WBC: CPT

## 2018-08-27 PROCEDURE — 36415 COLL VENOUS BLD VENIPUNCTURE: CPT

## 2018-08-27 PROCEDURE — 83735 ASSAY OF MAGNESIUM: CPT

## 2018-08-30 ENCOUNTER — HOSPITAL ENCOUNTER (OUTPATIENT)
Age: 73
End: 2018-08-30
Payer: COMMERCIAL

## 2018-08-30 DIAGNOSIS — R10.11: Primary | ICD-10-CM

## 2018-08-30 LAB
ANION GAP SERPL CALC-SCNC: 13.4 MEQ/L (ref 5–15)
BUN SERPL-MCNC: 18 MG/DL (ref 7–18)
CALCIUM SPEC-MCNC: 8.5 MG/DL (ref 8.5–10.1)
CHLORIDE SPEC-SCNC: 101 MMOL/L (ref 98–107)
CO2 SERPL-SCNC: 26 MMOL/L (ref 21–32)
CREAT BLD-SCNC: 1.17 MG/DL (ref 0.55–1.02)
ESTIMATED GLOMERULAR FILT RATE: 46 ML/MIN (ref 60–?)
GFR (AFRICAN AMERICAN): 55 ML/MIN (ref 60–?)
GLUCOSE: 108 MG/DL (ref 74–106)
POTASSIUM: 3.4 MMOL/L (ref 3.5–5.1)
SODIUM SPEC-SCNC: 137 MMOL/L (ref 136–145)

## 2018-08-30 PROCEDURE — 36415 COLL VENOUS BLD VENIPUNCTURE: CPT

## 2018-08-30 PROCEDURE — 80048 BASIC METABOLIC PNL TOTAL CA: CPT

## 2018-10-10 ENCOUNTER — HOSPITAL ENCOUNTER (OUTPATIENT)
Age: 73
End: 2018-10-10
Payer: COMMERCIAL

## 2018-10-10 DIAGNOSIS — G45.9: ICD-10-CM

## 2018-10-10 DIAGNOSIS — I63.9: Primary | ICD-10-CM

## 2018-10-10 PROCEDURE — 70551 MRI BRAIN STEM W/O DYE: CPT

## 2018-10-10 PROCEDURE — 70544 MR ANGIOGRAPHY HEAD W/O DYE: CPT

## 2018-10-10 PROCEDURE — 70547 MR ANGIOGRAPHY NECK W/O DYE: CPT

## 2018-10-17 ENCOUNTER — HOSPITAL ENCOUNTER (OUTPATIENT)
Age: 73
End: 2018-10-17
Payer: COMMERCIAL

## 2018-10-17 DIAGNOSIS — D64.9: Primary | ICD-10-CM

## 2018-10-17 PROCEDURE — 74245: CPT
